# Patient Record
Sex: FEMALE | Race: BLACK OR AFRICAN AMERICAN | NOT HISPANIC OR LATINO | Employment: OTHER | URBAN - METROPOLITAN AREA
[De-identification: names, ages, dates, MRNs, and addresses within clinical notes are randomized per-mention and may not be internally consistent; named-entity substitution may affect disease eponyms.]

---

## 2017-08-14 ENCOUNTER — PATIENT OUTREACH (OUTPATIENT)
Dept: ADMINISTRATIVE | Facility: HOSPITAL | Age: 57
End: 2017-08-14

## 2018-04-10 ENCOUNTER — OFFICE VISIT (OUTPATIENT)
Dept: OTOLARYNGOLOGY | Facility: CLINIC | Age: 58
End: 2018-04-10
Payer: MEDICARE

## 2018-04-10 VITALS
DIASTOLIC BLOOD PRESSURE: 88 MMHG | SYSTOLIC BLOOD PRESSURE: 132 MMHG | WEIGHT: 274.94 LBS | TEMPERATURE: 99 F | HEIGHT: 63 IN | BODY MASS INDEX: 48.71 KG/M2 | HEART RATE: 73 BPM

## 2018-04-10 DIAGNOSIS — J30.89 CHRONIC NONSEASONAL ALLERGIC RHINITIS DUE TO FUNGAL SPORES: ICD-10-CM

## 2018-04-10 DIAGNOSIS — J32.9 CHRONIC RECURRENT SINUSITIS: Primary | ICD-10-CM

## 2018-04-10 DIAGNOSIS — R42 DIZZINESS: ICD-10-CM

## 2018-04-10 PROCEDURE — 99999 PR PBB SHADOW E&M-EST. PATIENT-LVL IV: CPT | Mod: PBBFAC,,, | Performed by: PHYSICIAN ASSISTANT

## 2018-04-10 PROCEDURE — 99204 OFFICE O/P NEW MOD 45 MIN: CPT | Mod: S$GLB,,, | Performed by: PHYSICIAN ASSISTANT

## 2018-04-10 PROCEDURE — 3079F DIAST BP 80-89 MM HG: CPT | Mod: CPTII,S$GLB,, | Performed by: PHYSICIAN ASSISTANT

## 2018-04-10 PROCEDURE — 3075F SYST BP GE 130 - 139MM HG: CPT | Mod: CPTII,S$GLB,, | Performed by: PHYSICIAN ASSISTANT

## 2018-04-10 RX ORDER — ATORVASTATIN CALCIUM 40 MG/1
TABLET, FILM COATED ORAL
COMMUNITY
Start: 2018-03-13

## 2018-04-10 RX ORDER — TIZANIDINE 4 MG/1
4 TABLET ORAL EVERY 6 HOURS PRN
COMMUNITY

## 2018-04-10 RX ORDER — CETIRIZINE HYDROCHLORIDE, PSEUDOEPHEDRINE HYDROCHLORIDE 5; 120 MG/1; MG/1
TABLET, FILM COATED, EXTENDED RELEASE ORAL
COMMUNITY

## 2018-04-10 RX ORDER — HYDROCODONE BITARTRATE AND ACETAMINOPHEN 10; 325 MG/1; MG/1
TABLET ORAL
COMMUNITY

## 2018-04-10 RX ORDER — GABAPENTIN 300 MG/1
300 CAPSULE ORAL 3 TIMES DAILY
COMMUNITY

## 2018-04-10 RX ORDER — LOSARTAN POTASSIUM AND HYDROCHLOROTHIAZIDE 25; 100 MG/1; MG/1
1 TABLET ORAL DAILY
COMMUNITY

## 2018-04-10 RX ORDER — ERGOCALCIFEROL 1.25 MG/1
50000 CAPSULE ORAL
COMMUNITY

## 2018-04-10 RX ORDER — CEFPROZIL 500 MG/1
TABLET, FILM COATED ORAL
COMMUNITY
Start: 2018-04-04

## 2018-04-10 RX ORDER — POTASSIUM CHLORIDE 750 MG/1
20 TABLET, EXTENDED RELEASE ORAL 2 TIMES DAILY
COMMUNITY

## 2018-04-10 NOTE — PROGRESS NOTES
Subjective:       Patient ID: Sobia Jacobo is a 58 y.o. female.    Chief Complaint: Sinusitis    Patient is a very pleasant 58 year old female here to see me today for the first time for evaluation of her sinuses.  She was last here with Dr. Alvarez in 2013 with negative CT sinus at that time.  She reports issues with recurrent sinusitis and allergies.  She was seen in [place], [place] and had allergy testing (positive to molds, trees, grasses) and has been on SCIT for about one year with some relief of her allergy symptoms.  Her last dose of SCIT was last week.  She says her face seems to break out more (past 4-6 months) since starting SCIT.  She complains of nasal congestion, pressure in her face and ears, throat irritation, sneezing and headaches.  She's been treated with multiple antibiotics since January for sinusitis.  Currently on Cefzil.  Had steroid injection last week without improvement in her symptoms.  She's also had Bactrim, Zithromax and Cipro for sinusitis recently.  No previous sinus surgery.  She smokes 1-2 cigarettes/day.  Has been off of Flonase for months due to nasal dryness and irritation; stopped Zyrtec due to nausea.  She is on humidified CPAP at night and tolerates it without difficulty.  No recent fever.  She feels as though her equilibrium is off at times for several months.  Other times she has a spinning sensation; denies issues with dizziness while in bed.  Denies alleviating factors; exacerbated with weather changes.  Previously been told she has fluid in her ears.  Denies hearing loss.  Has occasional tinnitus AU.  No previous otologic surgery; no family history of hearing loss.  Has loud noise exposure history (occupational).      Review of Systems   Constitutional: Negative for activity change, appetite change and fever.   HENT: Positive for congestion, postnasal drip, rhinorrhea, sinus pain, sinus pressure, sneezing, tinnitus (occasional) and voice change (hoarse at times). Negative  for ear discharge, ear pain (pressure AU), hearing loss, nosebleeds, sore throat (dry, irritated) and trouble swallowing.    Eyes: Negative for discharge.   Respiratory: Positive for cough. Negative for shortness of breath and wheezing.         CHRISSY on CPAP   Cardiovascular: Negative for chest pain.   Gastrointestinal: Positive for nausea (with Zyrtec). Negative for diarrhea and vomiting.   Musculoskeletal: Positive for arthralgias.   Allergic/Immunologic: Positive for environmental allergies. Negative for food allergies.   Neurological: Positive for dizziness and headaches. Negative for light-headedness.   Hematological: Negative for adenopathy.   Psychiatric/Behavioral: Negative for confusion.       Objective:      Physical Exam   Constitutional: She is oriented to person, place, and time. She appears well-developed and well-nourished. No distress.   HENT:   Head: Normocephalic and atraumatic.   Right Ear: Tympanic membrane, external ear and ear canal normal. Tympanic membrane is not erythematous. No middle ear effusion.   Left Ear: Tympanic membrane, external ear and ear canal normal. Tympanic membrane is not erythematous.  No middle ear effusion.   Nose: Mucosal edema (hypertrophy of inferior turbinates bilaterally) and septal deviation present. No rhinorrhea or nasal deformity. No epistaxis. Right sinus exhibits maxillary sinus tenderness. Right sinus exhibits no frontal sinus tenderness. Left sinus exhibits maxillary sinus tenderness. Left sinus exhibits no frontal sinus tenderness.   Mouth/Throat: Uvula is midline, oropharynx is clear and moist and mucous membranes are normal. Mucous membranes are not pale and not dry. No trismus in the jaw. Normal dentition. No uvula swelling. No oropharyngeal exudate or posterior oropharyngeal erythema.   Eyes: Conjunctivae, EOM and lids are normal. Pupils are equal, round, and reactive to light. Right eye exhibits no chemosis. Left eye exhibits no chemosis. Right  conjunctiva is not injected. Left conjunctiva is not injected. No scleral icterus. Right eye exhibits normal extraocular motion and no nystagmus. Left eye exhibits normal extraocular motion and no nystagmus.   eyeglasses   Neck: Trachea normal and phonation normal. No tracheal tenderness present. No tracheal deviation present. No thyroid mass and no thyromegaly present.   Cardiovascular: Intact distal pulses.    Pulmonary/Chest: Effort normal. No stridor. No respiratory distress.   Abdominal: She exhibits no distension.   Lymphadenopathy:        Head (right side): No submental, no submandibular, no preauricular and no posterior auricular adenopathy present.        Head (left side): No submental, no submandibular, no preauricular and no posterior auricular adenopathy present.     She has no cervical adenopathy.   Neurological: She is alert and oriented to person, place, and time. No cranial nerve deficit.   Skin: Skin is warm and dry. No rash noted. No erythema.   Psychiatric: She has a normal mood and affect. Her behavior is normal.       Assessment:       1. Chronic recurrent sinusitis    2. Chronic nonseasonal allergic rhinitis due to fungal spores    3. Dizziness        Plan:         At this point, the patient has been on multiple rounds of antibiotics including Amoxicillin, Bactrim, Cefzil and Zithromax and Cipro without significant or sustained improvement in their symptoms.  I would recommend a CT of the sinuses for further evaluation.  If the scan shows persistent sinus disease, she will then need a longer course of antibiotics, likely three to four weeks of Levaquin.  If the scan does not show persistent infection, the patient's symptoms are likely due to underlying allergies and would then maximize allergy therapy.  She's currently one year in to SCIT course and has had some improvement in her symptoms.  We discussed that other treatment options for allergies would be intranasal steroid spray and oral  "antihistamines but she says she's already tried these in the past and is "tired of all of medicines."   She will likely continue with SCIT through PCP ([facility] [place] allergist) if CT is negative.  The patient understands this treatment plan, and will call with results when available.    Discussed with patient that her ear exam today is normal.  I see no middle ear fluid.  I had a long discussion with the patient regarding their symptoms.  Dizziness, vertigo and disequilibrium are common symptoms reported by adults during visits to their doctors. They are all symptoms that can result from a peripheral vestibular disorder (a dysfunction of the balance organs of the inner ear) or central vestibular disorder (a dysfunction of one or more parts of the central nervous system that help process balance and spatial information).  There are also non-vestibular causes of dizziness, and dizziness can be linked to a wide array of problems such as blood-flow irregularities from cardiovascular problems and blood pressure fluctuations.  I would recommend a VNG with audiogram for further diagnostic testing, and will contact the patient with further recommendations when that is complete.       "

## 2018-04-23 ENCOUNTER — CLINICAL SUPPORT (OUTPATIENT)
Dept: AUDIOLOGY | Facility: CLINIC | Age: 58
End: 2018-04-23
Payer: MEDICARE

## 2018-04-23 ENCOUNTER — HOSPITAL ENCOUNTER (OUTPATIENT)
Dept: RADIOLOGY | Facility: HOSPITAL | Age: 58
Discharge: HOME | End: 2018-04-23
Attending: PHYSICIAN ASSISTANT
Payer: MEDICARE

## 2018-04-23 ENCOUNTER — OFFICE VISIT (OUTPATIENT)
Dept: GASTROENTEROLOGY | Facility: CLINIC | Age: 58
End: 2018-04-23
Payer: MEDICARE

## 2018-04-23 VITALS
SYSTOLIC BLOOD PRESSURE: 134 MMHG | DIASTOLIC BLOOD PRESSURE: 90 MMHG | HEIGHT: 63 IN | HEART RATE: 70 BPM | BODY MASS INDEX: 49.14 KG/M2 | WEIGHT: 277.31 LBS

## 2018-04-23 DIAGNOSIS — J30.89 CHRONIC NONSEASONAL ALLERGIC RHINITIS DUE TO FUNGAL SPORES: ICD-10-CM

## 2018-04-23 DIAGNOSIS — K76.0 NAFLD (NONALCOHOLIC FATTY LIVER DISEASE): ICD-10-CM

## 2018-04-23 DIAGNOSIS — J32.9 CHRONIC RECURRENT SINUSITIS: ICD-10-CM

## 2018-04-23 DIAGNOSIS — R79.89 ABNORMAL LFTS: ICD-10-CM

## 2018-04-23 DIAGNOSIS — K57.30 DIVERTICULOSIS OF LARGE INTESTINE WITHOUT HEMORRHAGE: ICD-10-CM

## 2018-04-23 DIAGNOSIS — H81.02 ENDOLYMPHATIC HYDROPS, LEFT: Primary | ICD-10-CM

## 2018-04-23 DIAGNOSIS — H91.91 LOW FREQUENCY HEARING LOSS OF RIGHT EAR: ICD-10-CM

## 2018-04-23 DIAGNOSIS — H90.41 SENSORINEURAL HEARING LOSS (SNHL) OF RIGHT EAR WITH UNRESTRICTED HEARING OF LEFT EAR: Primary | ICD-10-CM

## 2018-04-23 DIAGNOSIS — R19.7 DIARRHEA IN ADULT PATIENT: Primary | ICD-10-CM

## 2018-04-23 DIAGNOSIS — Z86.010 HISTORY OF COLON POLYPS: ICD-10-CM

## 2018-04-23 DIAGNOSIS — E66.01 MORBID OBESITY: ICD-10-CM

## 2018-04-23 PROCEDURE — 99213 OFFICE O/P EST LOW 20 MIN: CPT | Mod: S$GLB,,, | Performed by: INTERNAL MEDICINE

## 2018-04-23 PROCEDURE — 99999 PR PBB SHADOW E&M-EST. PATIENT-LVL III: CPT | Mod: PBBFAC,,, | Performed by: INTERNAL MEDICINE

## 2018-04-23 PROCEDURE — 92557 COMPREHENSIVE HEARING TEST: CPT | Mod: S$GLB,,, | Performed by: AUDIOLOGIST-HEARING AID FITTER

## 2018-04-23 PROCEDURE — 70486 CT MAXILLOFACIAL W/O DYE: CPT | Mod: TC

## 2018-04-23 PROCEDURE — 92567 TYMPANOMETRY: CPT | Mod: S$GLB,,, | Performed by: AUDIOLOGIST-HEARING AID FITTER

## 2018-04-23 PROCEDURE — 92540 BASIC VESTIBULAR EVALUATION: CPT | Mod: S$GLB,,, | Performed by: AUDIOLOGIST-HEARING AID FITTER

## 2018-04-23 PROCEDURE — 3080F DIAST BP >= 90 MM HG: CPT | Mod: CPTII,S$GLB,, | Performed by: INTERNAL MEDICINE

## 2018-04-23 PROCEDURE — 3075F SYST BP GE 130 - 139MM HG: CPT | Mod: CPTII,S$GLB,, | Performed by: INTERNAL MEDICINE

## 2018-04-23 PROCEDURE — 92537 CALORIC VSTBLR TEST W/REC: CPT | Mod: S$GLB,,, | Performed by: AUDIOLOGIST-HEARING AID FITTER

## 2018-04-23 PROCEDURE — 92584 ELECTROCOCHLEOGRAPHY: CPT | Mod: S$GLB,,, | Performed by: AUDIOLOGIST-HEARING AID FITTER

## 2018-04-23 NOTE — PROGRESS NOTES
Subjective:       Patient ID: Sobia Jacobo is a 58 y.o. female.    Chief Complaint: Diarrhea and Abdominal Pain    E patient who has had recent issues with sinusitis requiring a series of antibiotic treatments, now presents with complaint of diarrhea. She just finished her last treatment about 10 days ago. Her last treatment was with Zithromax. She has had bactrim, and Cipro as well. She began with worsening diarrhea during the course of her treatments. There has been improvement since treatment completed. She has had no fever, chills or sweats associated, though she has had sweats for other reasons. There has been no associated abdominal pain or nausea or vomiting. There has been no BRBPR or melena.     She has been having diarrhea before but she'd also been on Metformin that was stopped in January. She has had colonoscopies in 2012 ans 12965. She is not due for another screening study until 1020. She also has diverticulosis.     Finally she has NAFLD. We have no recent labs but she says her HGB A1C has been in the 5s and her Metformin stopped. Discussed the etiologies of NAFLD and it has been noted that she has had no problems with high triglycerides and has normal HGB A1C so these things likely not playing a role. This is likely from Morbid Obesity.      Review of Systems   Constitutional: Positive for fatigue. Negative for activity change, appetite change, chills, diaphoresis, fever and unexpected weight change.   HENT: Positive for ear discharge, postnasal drip and voice change. Negative for congestion, ear pain, hearing loss, nosebleeds and tinnitus.    Eyes: Negative for photophobia and visual disturbance.   Respiratory: Negative for apnea, cough, choking, chest tightness, shortness of breath and wheezing.    Cardiovascular: Negative for chest pain, palpitations and leg swelling.   Gastrointestinal: Positive for diarrhea. Negative for abdominal distention, abdominal pain, anal bleeding, blood in stool,  constipation, nausea, rectal pain and vomiting.   Genitourinary: Negative for difficulty urinating, dyspareunia, dysuria, flank pain, frequency, hematuria, menstrual problem, pelvic pain, urgency, vaginal bleeding and vaginal discharge.   Musculoskeletal: Positive for back pain and joint swelling. Negative for arthralgias, gait problem, myalgias and neck stiffness.        Joint stiffness   Skin: Positive for rash. Negative for pallor.   Neurological: Negative for dizziness, tremors, seizures, syncope, speech difficulty, weakness, numbness and headaches.        Coordination problems   Hematological: Negative for adenopathy.   Psychiatric/Behavioral: Negative for agitation, confusion, hallucinations, sleep disturbance and suicidal ideas.       Objective:      Physical Exam   Constitutional: She is oriented to person, place, and time. She appears well-developed and well-nourished.   Morbid Obesity   HENT:   Head: Normocephalic and atraumatic.   Bilateral turbinate congestion   Eyes: Conjunctivae and EOM are normal. Pupils are equal, round, and reactive to light. Right eye exhibits no discharge. Left eye exhibits no discharge. No scleral icterus.   Neck: Normal range of motion. Neck supple. No JVD present. No thyromegaly present.   Faint right carotid bruit   Cardiovascular: Normal rate, regular rhythm, normal heart sounds and intact distal pulses.  Exam reveals no gallop and no friction rub.    No murmur heard.  Pulmonary/Chest: Effort normal and breath sounds normal. No respiratory distress. She has no wheezes. She has no rales. She exhibits no tenderness.   Abdominal: Soft. Bowel sounds are normal. She exhibits no distension and no mass. There is no tenderness. There is no rebound and no guarding.   Musculoskeletal: Normal range of motion. She exhibits no edema.   Lymphadenopathy:     She has no cervical adenopathy.   Neurological: She is alert and oriented to person, place, and time. She has normal reflexes. She  exhibits normal muscle tone. Coordination normal.   Skin: Skin is warm and dry. No rash noted. No erythema. No pallor.   Psychiatric: She has a normal mood and affect. Her behavior is normal. Judgment and thought content normal.   Vitals reviewed.      Assessment:    Diarrhea   Abnormal LFTs   NAFLD   Morbid Obesity   Hx of Colon Polyps   Diverticulosis   No diagnosis found.    Plan:   Stool studies  Hepatitis panel  Other labs for appropriate workup  Further recommendations to follow

## 2018-04-23 NOTE — PROGRESS NOTES
Referring provider: Tali Muñiz PA-C    Sobia Jacobo was seen 04/23/2018 for an audiological evaluation.  Patient complains of problems with equilibrium at times for several months.  Other times she has a spinning sensation; denies issues with dizziness while in bed.  Denies alleviating factors; exacerbated with weather changes.  Denies hearing loss, noting equally sided hearing except at times she is having sinus problems.  During those times she notes fluctuations in her hearing.  Has occasional tinnitus AU recurring over the past year.  No previous otologic surgery; no family history of hearing loss.  Has loud noise exposure history (occupational).    Results reveal a mild low-frequency 125-500 Hz sensorineural hearing loss rising to normal 1851-3759 Hz for the right ear, and normal hearing 125-8000 Hz for the left ear.   Speech Reception Thresholds were  15 dBHL for the right ear and 10 dBHL for the left ear.   Word recognition scores were excellent for the right ear and excellent for the left ear.   Tympanograms were Type A, normal for the right ear and Type A, normal for the left ear.    Patient was counseled on the above findings.    Rec: VNG/EcoG -returns later today to complete testing

## 2018-04-27 ENCOUNTER — PATIENT MESSAGE (OUTPATIENT)
Dept: GASTROENTEROLOGY | Facility: CLINIC | Age: 58
End: 2018-04-27

## 2018-05-01 ENCOUNTER — TELEPHONE (OUTPATIENT)
Dept: OTOLARYNGOLOGY | Facility: CLINIC | Age: 58
End: 2018-05-01

## 2018-05-01 NOTE — TELEPHONE ENCOUNTER
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  Her VNG was normal but Ecog showed Meniere's.  Left message for her to call me back to discuss further.    Hydrops, or Meniere's is caused by excess endolymphatic fluid in the inner ear.  Hydrops was previously classified as Meniere's disease in which the classic constellation of symptoms included dizziness, fullness, tinnitus, and fluctuating hearing loss.  However, as more research is being done, it is now accepted that a patient with hydrops can have one, some, or all of those symptoms.  I agree with first starting on the hydrops diet as discussed with Darlin, which is a low sodium diet.  If symptoms persist, she will then have a BMP done to check electrolytes and will then start on oral dyazide.  If symptoms are persistent after a two month trial of the diuretic, will then send a referral to Dr. Pierre at [facility], as there are also other treatment modalities, including surgery, available.

## 2024-04-22 ENCOUNTER — OFFICE VISIT (OUTPATIENT)
Dept: OTOLARYNGOLOGY | Facility: CLINIC | Age: 64
End: 2024-04-22
Payer: MEDICARE

## 2024-04-22 VITALS — HEIGHT: 63 IN | BODY MASS INDEX: 38.21 KG/M2 | TEMPERATURE: 98 F | WEIGHT: 215.63 LBS

## 2024-04-22 DIAGNOSIS — J32.4 CHRONIC PANSINUSITIS: Primary | ICD-10-CM

## 2024-04-22 PROCEDURE — 3008F BODY MASS INDEX DOCD: CPT | Mod: CPTII,S$GLB,, | Performed by: PHYSICIAN ASSISTANT

## 2024-04-22 PROCEDURE — 99214 OFFICE O/P EST MOD 30 MIN: CPT | Mod: S$GLB,,, | Performed by: PHYSICIAN ASSISTANT

## 2024-04-22 PROCEDURE — 4010F ACE/ARB THERAPY RXD/TAKEN: CPT | Mod: CPTII,S$GLB,, | Performed by: PHYSICIAN ASSISTANT

## 2024-04-22 PROCEDURE — 99999 PR PBB SHADOW E&M-NEW PATIENT-LVL III: CPT | Mod: PBBFAC,,, | Performed by: PHYSICIAN ASSISTANT

## 2024-04-22 NOTE — PROGRESS NOTES
Subjective:   Patient ID: Sobia Jacobo is a 64 y.o. female.    Chief Complaint: Sinusitis (Allergy and sinus symptoms. Patient was getting allergy injections but she stopped due to getting sinus infections. Patient c/o bloody mucus coming out of her nose. She c/o headaches and teeth pain. When leaning down she gets sinus pressure. She states as long as she is on antibiotics she feels fine but once antibiotic is complete the symptoms return.)     Ms. Jacobo is a 63 yo female here to see me today with c/o recurrent sinusitis, pain on left face. She was last here with Tali Muñiz in 2018 with negative CT  and before that 2013 with negative CT sinus at that time.  She reports issues with recurrent sinusitis and allergies.  She was seen in [place], [place] and had allergy testing (positive to molds, trees, grasses) and has been on SCIT for about 2 year with some relief of her allergy symptoms but stayed with sinus infections. She complains of nasal congestion, pressure in her face and ears, throat irritation, sneezing and headaches.  She's been treated with multiple antibiotics since January for sinusitis ( 4-5) .  She just completed Amoxil last week.  She's also had Bactrim, Zithromax and Cipro for sinusitis recently.  No previous sinus surgery.  She quit smoking in 2019.  She currently taking Singulair, Flonase.  No recent fever.  She feels like her balance is off and her vision is blurry.       Review of patient's allergies indicates:   Allergen Reactions    Aspirin     Mobic [meloxicam]     Penicillins            Review of Systems   HENT:  Positive for nosebleeds, postnasal drip, sinus pressure and voice change.    Eyes:  Positive for itching.   Cardiovascular: Negative.    Gastrointestinal:  Positive for diarrhea.   Endocrine: Negative.    Genitourinary: Negative.    Musculoskeletal:  Positive for back pain.   Skin: Negative.    Neurological:  Positive for dizziness, tremors, light-headedness and headaches.  "  Hematological:  Bruises/bleeds easily.   Psychiatric/Behavioral:  Positive for decreased concentration and sleep disturbance.          Objective:   Temp 98.1 °F (36.7 °C) (Temporal)   Ht 5' 3" (1.6 m)   Wt 97.8 kg (215 lb 9.8 oz)   BMI 38.19 kg/m²     Physical Exam  Constitutional:       General: She is not in acute distress.     Appearance: She is well-developed.   HENT:      Head: Normocephalic and atraumatic.      Right Ear: Tympanic membrane, ear canal and external ear normal.      Left Ear: Tympanic membrane, ear canal and external ear normal.      Nose: No nasal deformity, septal deviation, mucosal edema or rhinorrhea.      Right Sinus: No maxillary sinus tenderness or frontal sinus tenderness.      Left Sinus: Maxillary sinus tenderness and frontal sinus tenderness present.      Mouth/Throat:      Mouth: Mucous membranes are not pale and not dry.      Dentition: No dental caries.      Pharynx: Uvula midline. No oropharyngeal exudate or posterior oropharyngeal erythema.   Eyes:      General: Lids are normal. No scleral icterus.     Extraocular Movements:      Right eye: Normal extraocular motion and no nystagmus.      Left eye: Normal extraocular motion and no nystagmus.      Conjunctiva/sclera: Conjunctivae normal.      Right eye: Right conjunctiva is not injected. No chemosis.     Left eye: Left conjunctiva is not injected. No chemosis.     Pupils: Pupils are equal, round, and reactive to light.   Neck:      Thyroid: No thyroid mass or thyromegaly.      Trachea: Trachea and phonation normal. No tracheal tenderness or tracheal deviation.   Pulmonary:      Effort: Pulmonary effort is normal. No respiratory distress.      Breath sounds: No stridor.   Abdominal:      General: There is no distension.   Lymphadenopathy:      Head:      Right side of head: No submental, submandibular, preauricular, posterior auricular or occipital adenopathy.      Left side of head: No submental, submandibular, preauricular, " "posterior auricular or occipital adenopathy.      Cervical: No cervical adenopathy.   Skin:     General: Skin is warm and dry.      Findings: No erythema or rash.   Neurological:      Mental Status: She is alert and oriented to person, place, and time.      Cranial Nerves: No cranial nerve deficit.   Psychiatric:         Behavior: Behavior normal.            Assessment:     1. Chronic pansinusitis        Plan:     Chronic pansinusitis  -     CT Sinuses without Contrast; Future; Expected date: 04/22/2024        At this point, the patient has been on multiple rounds of antibiotics including Amoxicillin, Bactrim, Cefzil and Zithromax and Cipro without significant or sustained improvement in their symptoms.  I would recommend a CT of the sinuses for further evaluation.  If the scan shows persistent sinus disease, she will then need a longer course of antibiotics, likely three to four weeks of Levaquin.  If the scan does not show persistent infection, the patient's symptoms are likely due to underlying allergies and would then maximize allergy therapy.    We discussed that other treatment options for allergies would be intranasal steroid spray and oral antihistamines but she says she's already tried these in the past and is "tired of all of medicines" and they upset her stomach. Will plan to have her follow up with MD for possible nasal endoscopy and further treatment options.    "

## 2024-06-03 ENCOUNTER — TELEPHONE (OUTPATIENT)
Dept: OTOLARYNGOLOGY | Facility: CLINIC | Age: 64
End: 2024-06-03

## 2024-06-03 ENCOUNTER — OFFICE VISIT (OUTPATIENT)
Dept: OTOLARYNGOLOGY | Facility: CLINIC | Age: 64
End: 2024-06-03
Attending: PHYSICIAN ASSISTANT
Payer: MEDICARE

## 2024-06-03 ENCOUNTER — HOSPITAL ENCOUNTER (OUTPATIENT)
Dept: RADIOLOGY | Facility: HOSPITAL | Age: 64
Discharge: HOME | End: 2024-06-03
Attending: PHYSICIAN ASSISTANT
Payer: MEDICARE

## 2024-06-03 DIAGNOSIS — R51.9 NONINTRACTABLE HEADACHE, UNSPECIFIED CHRONICITY PATTERN, UNSPECIFIED HEADACHE TYPE: Primary | ICD-10-CM

## 2024-06-03 DIAGNOSIS — J30.9 ALLERGIC RHINITIS, UNSPECIFIED SEASONALITY, UNSPECIFIED TRIGGER: ICD-10-CM

## 2024-06-03 DIAGNOSIS — J32.4 CHRONIC PANSINUSITIS: ICD-10-CM

## 2024-06-03 PROCEDURE — 70486 CT MAXILLOFACIAL W/O DYE: CPT | Mod: TC

## 2024-06-03 PROCEDURE — 4010F ACE/ARB THERAPY RXD/TAKEN: CPT | Mod: CPTII,S$GLB,, | Performed by: STUDENT IN AN ORGANIZED HEALTH CARE EDUCATION/TRAINING PROGRAM

## 2024-06-03 PROCEDURE — 1159F MED LIST DOCD IN RCRD: CPT | Mod: CPTII,S$GLB,, | Performed by: STUDENT IN AN ORGANIZED HEALTH CARE EDUCATION/TRAINING PROGRAM

## 2024-06-03 PROCEDURE — 99999 PR PBB SHADOW E&M-EST. PATIENT-LVL III: CPT | Mod: PBBFAC,,, | Performed by: STUDENT IN AN ORGANIZED HEALTH CARE EDUCATION/TRAINING PROGRAM

## 2024-06-03 PROCEDURE — 31231 NASAL ENDOSCOPY DX: CPT | Mod: S$GLB,,, | Performed by: STUDENT IN AN ORGANIZED HEALTH CARE EDUCATION/TRAINING PROGRAM

## 2024-06-03 PROCEDURE — 99214 OFFICE O/P EST MOD 30 MIN: CPT | Mod: 25,S$GLB,, | Performed by: STUDENT IN AN ORGANIZED HEALTH CARE EDUCATION/TRAINING PROGRAM

## 2024-06-03 PROCEDURE — 70486 CT MAXILLOFACIAL W/O DYE: CPT | Mod: 26,,, | Performed by: RADIOLOGY

## 2024-06-03 RX ORDER — FLUTICASONE FUROATE 27.5 UG/1
2 SPRAY, METERED NASAL DAILY
Qty: 9.1 ML | Refills: 6 | Status: SHIPPED | OUTPATIENT
Start: 2024-06-03

## 2024-06-03 NOTE — PROGRESS NOTES
Chief complaint:    Chief Complaint   Patient presents with    Consult     Pt is coming in today for her CT follow up, pt states she bumped her head on the CT machine and now she has a knot on her head and she is concerned             Referring Provider:  Rita Chacon Pa-c  [id number] [facility] [place]  [place] [address]      History of present illness, with Rita Chacon PA-C 4/22/24:     Ms. Jacobo is a 65 yo female here to see me today with c/o recurrent sinusitis, pain on left face. She was last here with Tali Muñiz in 2018 with negative CT and before that 2013 with negative CT sinus at that time. She reports issues with recurrent sinusitis and allergies. She was seen in [place], [place] and had allergy testing (positive to molds, trees, grasses) and has been on SCIT for about 2 year with some relief of her allergy symptoms but stayed with sinus infections. She complains of nasal congestion, pressure in her face and ears, throat irritation, sneezing and headaches. She's been treated with multiple antibiotics since January for sinusitis ( 4-5) . She just completed Amoxil last week. She's also had Bactrim, Zithromax and Cipro for sinusitis recently. No previous sinus surgery. She quit smoking in 2019. She currently taking Singulair, Flonase. No recent fever. She feels like her balance is off and her vision is blurry       Update 6/3/24    Major symptoms include bloody mucus when blowing her nose in the mornings, headaches (left forehead/facial/occipital, sensitive to light, floaters), scratchy throat, blurry vision, feeling off balance, tinnitus, ears feeling wet in the mornings.   Her symptoms have been present for many years, and progressively worsening recently.  Has done allergy shots. States they did not help the sinuses.   States she has had 5+ sinus infections since January.   Tx has included montelukast, flonase       History      Past Medical History:   Past Medical History:   Diagnosis Date     Fatty liver     GERD (gastroesophageal reflux disease)     Hypertension     Prediabetes     Seasonal allergies     Sleep apnea          Past Surgical History:  Past Surgical History:   Procedure Laterality Date    breast reduction      HYSTERECTOMY      ROTATOR CUFF REPAIR           Medications: Medication list reviewed. She  has a current medication list which includes the following prescription(s): atorvastatin, ergocalciferol, furosemide, gabapentin, hydrocodone-acetaminophen, losartan-hydrochlorothiazide 100-25 mg, pantoprazole, potassium chloride sa, albuterol, brompheniramine-pseudoephedrine-dextromethorphan, cefprozil, cetirizine-pseudoephedrine, clonazepam, flonase sensimist, metformin, metoprolol succinate, and tizanidine.     Allergies:   Review of patient's allergies indicates:   Allergen Reactions    Aspirin     Mobic [meloxicam]     Penicillins          Family history: family history includes Asthma in her cousin, maternal grandmother, and paternal aunt; Diabetes in her sister; Hypertension in her father and mother.         Social History          Alcohol use:  reports current alcohol use of about 7.0 standard drinks of alcohol per week.            Tobacco:  reports that she has been smoking cigarettes. She has a 2 pack-year smoking history. She does not have any smokeless tobacco history on file.         Physical Examination      Vitals: There were no vitals taken for this visit.      General: Well developed, well nourished, well hydrated.     Voice: no dysphonia, no dysarthria      Head/Face: Normocephalic, atraumatic. No scars or lesions. Facial musculature equal.     Eyes: No scleral icterus or conjunctival hemorrhage. EOMI. PERRLA.     Ears:     Right ear: No gross deformity. EAC is clear of debris and erythema. TM are intact with a pneumatized middle ear. No signs of retraction, fluid or infection.      Left ear: No gross deformity. EAC is clear of debris and erythema. TM are intact with a  pneumatized middle ear. No signs of retraction, fluid or infection.      Nose: No gross deformity or lesions. No purulent discharge. No significant NSD.     Mouth/Oropharynx: Lips without any lesions. No mucosal lesions within the oropharynx. No tonsillar exudate or lesions. Pharyngeal walls symmetrical. Uvula midline. Tongue midline without lesions.     Neurologic: Moving all extremities without gross abnormality.CN II-XII grossly intact. House-Brackmann 1/6. No signs of nystagmus.          Data reviewed      Review of records:      I reviewed records from the referring provider's office visits describing the history, workup, and/or treatment of this problem thus far.       Imaging:      I have independently reviewed the following imaging with the findings noted below:     CT sinus 6/3/24  Clear paranasal sinuses  Mild right septal deviation  Inferior turbinate hypertrophy     NASAL ENDOSCOPY       Indication: Sobia Jacobo is a 64 y.o. female  with sinonasal symptoms that were not able to be explained by anterior rhinoscopy alone, thus necessitating nasal endoscopy.     Procedure: Risks, benefits, and alternatives of the procedure were discussed with the patient, and the patient consented to the nasal endoscopy.  The nasal cavity was sprayed with a topical decongestant and anesthetic (if needed). The endoscope was passed into each nostril and each nasal cavity was visualized.  On each side the nasal cavity, sinuses (if open), turbinates, middle and superior meatus, sphenoethmoidal recess and septum were examined with the findings described below. At the end of the examination, the scope was removed. The patient tolerated the procedure well with no complications.       Endoscopic Sinonasal Exam Findings:  -     The right side has normal mucosa  -     The left side has normal mucosa  -     Nasal secretions: No discolored secretions noted bilaterally  -     Nasal septum: no significant deviation or perforation  appreciated   -     Inferior turbinate: Normal mucosa without significant hypertrophy bilaterally  -     Middle turbinate: Normal mucosa without significant hypertrophy bilaterally  -     Other findings: none      Assessment/Plan:    1. Nonintractable headache, unspecified chronicity pattern, unspecified headache type    2. Allergic rhinitis, unspecified seasonality, unspecified trigger      Suspect headache and ocular symptoms are related to migraine. She does endorse prior history of migraine with severe episodes dating back to 2009. Recommend Excedrin/ibuprofen/tylenol for OTC abortive therapy and neurology referral    She does have concurrent allergic rhinitis and we discussed the following:  Switch to flonase sensimist for bloody mucus in mornings  Continue oral antihistamine, nasal steroid, montelukast          Barrera Quezada MD  [facility]   [facility] [address]  [place] [address]  P: [phone]  F: [phone]

## 2024-06-03 NOTE — TELEPHONE ENCOUNTER
Call placed to patient in regards to CT sinus. Informed patient per Rita PERSAUD      Please call and let Ms. Jacobo know that her CT sinus is clear without signs of infection.   Patient verbalized understanding.

## 2024-06-11 ENCOUNTER — TELEPHONE (OUTPATIENT)
Dept: OTOLARYNGOLOGY | Facility: CLINIC | Age: 64
End: 2024-06-11
Payer: MEDICARE

## 2024-06-11 NOTE — TELEPHONE ENCOUNTER
----- Message from Nury Wynn sent at 6/11/2024  8:46 AM CDT -----  Regarding: concerns  Name of who is calling:   Trisha / [facility]      What is the request in detail: Requesting a call back in ref to rx for nasal spray / clarification on switching rx due to no longer carry original      Can the clinic reply by [facility]:no      What number to call back if not [facility]:[phone]

## 2024-09-16 ENCOUNTER — TELEPHONE (OUTPATIENT)
Dept: ALLERGY | Facility: CLINIC | Age: 64
End: 2024-09-16
Payer: MEDICARE

## 2024-09-16 NOTE — TELEPHONE ENCOUNTER
9/16/24 - Spoke with patient regarding appointment reschedule. Patient would like to be seen sooner. Advised patient to call back at a later time due to not having nothing available on a Friday during her preferred time. Patient's future appointment is added to the waiting list. - IB  ----- Message from Jayla Simmons sent at 9/16/2024  1:34 PM CDT -----  Contact: Sobia Ramsay is calling to speak to the nurse regarding her scheduled appointment on 11/06, patient is calling to reschedule, please give her a call at , she would like a appointment sooner than what I have    Thanks  LJ

## 2024-09-17 ENCOUNTER — TELEPHONE (OUTPATIENT)
Dept: ALLERGY | Facility: CLINIC | Age: 64
End: 2024-09-17
Payer: MEDICARE

## 2024-09-19 ENCOUNTER — OFFICE VISIT (OUTPATIENT)
Dept: ALLERGY | Facility: CLINIC | Age: 64
End: 2024-09-19
Payer: MEDICARE

## 2024-09-19 ENCOUNTER — LAB VISIT (OUTPATIENT)
Dept: LAB | Facility: HOSPITAL | Age: 64
End: 2024-09-19
Attending: STUDENT IN AN ORGANIZED HEALTH CARE EDUCATION/TRAINING PROGRAM
Payer: MEDICARE

## 2024-09-19 VITALS
HEIGHT: 63 IN | DIASTOLIC BLOOD PRESSURE: 82 MMHG | TEMPERATURE: 99 F | HEART RATE: 74 BPM | SYSTOLIC BLOOD PRESSURE: 126 MMHG | WEIGHT: 209.44 LBS | BODY MASS INDEX: 37.11 KG/M2

## 2024-09-19 DIAGNOSIS — J32.9 RECURRENT SINUS INFECTIONS: ICD-10-CM

## 2024-09-19 DIAGNOSIS — J31.0 CHRONIC RHINITIS: Primary | ICD-10-CM

## 2024-09-19 DIAGNOSIS — J31.0 CHRONIC RHINITIS: ICD-10-CM

## 2024-09-19 DIAGNOSIS — T50.905A ADVERSE EFFECT OF DRUG, INITIAL ENCOUNTER: ICD-10-CM

## 2024-09-19 LAB
BASOPHILS # BLD AUTO: 0.02 K/UL (ref 0–0.2)
BASOPHILS NFR BLD: 0.3 % (ref 0–1.9)
DIFFERENTIAL METHOD BLD: ABNORMAL
EOSINOPHIL # BLD AUTO: 0.1 K/UL (ref 0–0.5)
EOSINOPHIL NFR BLD: 1.2 % (ref 0–8)
ERYTHROCYTE [DISTWIDTH] IN BLOOD BY AUTOMATED COUNT: 15.2 % (ref 11.5–14.5)
HCT VFR BLD AUTO: 38.6 % (ref 37–48.5)
HGB BLD-MCNC: 11.9 G/DL (ref 12–16)
IMM GRANULOCYTES # BLD AUTO: 0.01 K/UL (ref 0–0.04)
IMM GRANULOCYTES NFR BLD AUTO: 0.2 % (ref 0–0.5)
LYMPHOCYTES # BLD AUTO: 2.6 K/UL (ref 1–4.8)
LYMPHOCYTES NFR BLD: 45.1 % (ref 18–48)
MCH RBC QN AUTO: 26.7 PG (ref 27–31)
MCHC RBC AUTO-ENTMCNC: 30.8 G/DL (ref 32–36)
MCV RBC AUTO: 87 FL (ref 82–98)
MONOCYTES # BLD AUTO: 0.6 K/UL (ref 0.3–1)
MONOCYTES NFR BLD: 11 % (ref 4–15)
NEUTROPHILS # BLD AUTO: 2.4 K/UL (ref 1.8–7.7)
NEUTROPHILS NFR BLD: 42.2 % (ref 38–73)
NRBC BLD-RTO: 0 /100 WBC
PLATELET # BLD AUTO: 172 K/UL (ref 150–450)
PMV BLD AUTO: 11.6 FL (ref 9.2–12.9)
RBC # BLD AUTO: 4.45 M/UL (ref 4–5.4)
WBC # BLD AUTO: 5.74 K/UL (ref 3.9–12.7)

## 2024-09-19 PROCEDURE — 86317 IMMUNOASSAY INFECTIOUS AGENT: CPT | Performed by: STUDENT IN AN ORGANIZED HEALTH CARE EDUCATION/TRAINING PROGRAM

## 2024-09-19 PROCEDURE — 82785 ASSAY OF IGE: CPT | Performed by: STUDENT IN AN ORGANIZED HEALTH CARE EDUCATION/TRAINING PROGRAM

## 2024-09-19 PROCEDURE — 85025 COMPLETE CBC W/AUTO DIFF WBC: CPT | Performed by: STUDENT IN AN ORGANIZED HEALTH CARE EDUCATION/TRAINING PROGRAM

## 2024-09-19 PROCEDURE — 82784 ASSAY IGA/IGD/IGG/IGM EACH: CPT | Mod: 59 | Performed by: STUDENT IN AN ORGANIZED HEALTH CARE EDUCATION/TRAINING PROGRAM

## 2024-09-19 PROCEDURE — 82784 ASSAY IGA/IGD/IGG/IGM EACH: CPT | Performed by: STUDENT IN AN ORGANIZED HEALTH CARE EDUCATION/TRAINING PROGRAM

## 2024-09-19 PROCEDURE — 86003 ALLG SPEC IGE CRUDE XTRC EA: CPT | Performed by: STUDENT IN AN ORGANIZED HEALTH CARE EDUCATION/TRAINING PROGRAM

## 2024-09-19 PROCEDURE — 99999 PR PBB SHADOW E&M-EST. PATIENT-LVL IV: CPT | Mod: PBBFAC,,, | Performed by: STUDENT IN AN ORGANIZED HEALTH CARE EDUCATION/TRAINING PROGRAM

## 2024-09-19 PROCEDURE — 87389 HIV-1 AG W/HIV-1&-2 AB AG IA: CPT | Performed by: STUDENT IN AN ORGANIZED HEALTH CARE EDUCATION/TRAINING PROGRAM

## 2024-09-19 RX ORDER — AZELASTINE 1 MG/ML
1 SPRAY, METERED NASAL 2 TIMES DAILY
Qty: 90 ML | Refills: 2 | Status: SHIPPED | OUTPATIENT
Start: 2024-09-19 | End: 2025-09-19

## 2024-09-19 RX ORDER — FLUTICASONE PROPIONATE 50 MCG
1 SPRAY, SUSPENSION (ML) NASAL 2 TIMES DAILY
Qty: 16 G | Refills: 11 | Status: SHIPPED | OUTPATIENT
Start: 2024-09-19 | End: 2025-09-19

## 2024-09-19 NOTE — PROGRESS NOTES
Allergy and Immunology  New Patient Clinic Note    Date: 9/19/2024  Chief Complaint   Patient presents with    Allergic Rhinitis      Referred by: Self, Aaareferral  No address on file    History  Sobia Jacobo is a 64 y.o. female being seen as a New Patient today.    Chronic Rhinitis   - Onset: Childhood with worsening in adulthood   - Symptoms: Congestion, rhinorrhea, sneezing, PND, throat clearing   - Suspected triggers include: Environmental v overproduction   - Pattern: Perennial with Seasonal Exacerbation  - Medications: PRN antihistamines   - Prior AIT for 2 years     Chronic or Inducible Urticaria  - No hx of chronic urticaria     Asthma   - No hx of asthma    CRSwNP  - No hx of CRSwNP     Eczema   - No hx of eczema     Eosinophilic Esophagitis  - No hx of eosinophilic esophagitis     Food Allergy  - No hx of food allergy     Drug Allergy  - Mobic: Chest tightness, no urticaria   - ASA: tachycardia     Recurrent Infections  - Patient with recurrent infections   - Patient reported 3-4 infections per years  - No hx of otitis media or PNA in adulthood   - No hx of sepsis or recurrent viral/fungal infection    Venom Allergy  - No hx of venom allergy    Allergies, PMH, PSH, Social, and Family History were reviewed.    Review of patient's allergies indicates:   Allergen Reactions    Mobic [meloxicam] Shortness Of Breath    Aspirin       Past Medical History:   Diagnosis Date    Fatty liver     GERD (gastroesophageal reflux disease)     Hypertension     Prediabetes     Seasonal allergies     Sleep apnea      Past Surgical History:   Procedure Laterality Date    breast reduction      HYSTERECTOMY      ROTATOR CUFF REPAIR       Social History     Social History Narrative    Not on file     S/he reports that she has been smoking cigarettes. She has a 2 pack-year smoking history. She does not have any smokeless tobacco history on file. She reports current alcohol use of about 7.0 standard drinks of alcohol per week.  She reports that she does not use drugs.    Current Outpatient Medications on File Prior to Visit   Medication Sig Dispense Refill    ergocalciferol (ERGOCALCIFEROL) 50,000 unit Cap Take 50,000 Units by mouth every 7 days.      fluticasone (FLONASE SENSIMIST) 27.5 mcg/actuation nasal spray 2 sprays by Nasal route once daily. 9.1 mL 6    furosemide (LASIX) 40 MG tablet Take 1 tablet (40 mg total) by mouth 2 (two) times daily. 60 tablet 3    gabapentin (NEURONTIN) 300 MG capsule Take 300 mg by mouth 3 (three) times daily.      hydrocodone-acetaminophen 10-325mg (NORCO)  mg Tab Take by mouth.      losartan-hydrochlorothiazide 100-25 mg (HYZAAR) 100-25 mg per tablet Take 1 tablet by mouth once daily.      pantoprazole (PROTONIX) 20 MG tablet Take 1 tablet (20 mg total) by mouth once daily. 30 tablet 0    potassium chloride SA (K-DUR,KLOR-CON) 10 MEQ tablet Take 20 mEq by mouth 2 (two) times daily.      albuterol 90 mcg/actuation inhaler Inhale 2 puffs into the lungs every 4 (four) hours as needed. 1 Inhaler 2    atorvastatin (LIPITOR) 40 MG tablet       brompheniramine-pseudoephedrine-dextromethorphan (DIMETAPP DM) 1-15-5 mg/5 mL Elix Take by mouth every 6 (six) hours as needed.      cefPROZIL (CEFZIL) 500 MG tablet       cetirizine-pseudoephedrine 5-120 mg Tb12 Take by mouth.      clonazePAM (KLONOPIN) 0.5 MG tablet Take 0.5 mg by mouth every evening.      metformin (GLUCOPHAGE) 500 MG tablet Take 1 tablet (500 mg total) by mouth 2 (two) times daily with meals. 60 tablet 6    metoprolol succinate (TOPROL-XL) 25 MG 24 hr tablet Take 1 tablet (25 mg total) by mouth once daily. 90 tablet 0    tiZANidine (ZANAFLEX) 4 MG tablet Take 4 mg by mouth every 6 (six) hours as needed.       No current facility-administered medications on file prior to visit.     Physical Examination  Vitals:    09/19/24 1421   BP: 126/82   Pulse: 74   Temp: 98.6 °F (37 °C)     GENERAL:  female in no apparent distress and well developed and  well nourished  HEAD:  Normocephalic, without obvious abnormality, atraumatic  EYES: sclera anicteric, conjunctiva normochromic  EARS: normal TM's and external ear canals both ears  NOSE: pale and boggy, clear and copious discharge, turbinates swollen    OROPHARYNX: moist mucous membranes without erythema, exudates or petechiae, clear post-nasal drainage present  LYMPH NODES: normal, supple, no lymphadenopathy  LUNGS: clear to auscultation, no wheezes, rales or rhonchi, symmetric air entry.  HEART: normal rate, regular rhythm, normal S1, S2, no murmurs, rubs, clicks or gallops.  ABDOMEN: soft, nontender, nondistended, no masses or organomegaly.  MUSCULOSKELETAL: no gross joint deformity or swelling.  NEURO: alert, oriented, normal speech, no focal findings or movement disorder noted.  SKIN: normal coloration and turgor, no rashes, no suspicious skin lesions noted.     Assessment/Plan:   Problem List Items Addressed This Visit          ENT    Chronic rhinitis - Primary    Current Assessment & Plan     - Not controled at this time   - Ordered Flonase 1 SEN BID   - Ordered Astelin 1 SEN BID   - Educated on proper use of intranasal sprays  - Ordered Serum IgE to Zone 6 Aeroallergens   - Will continue to monitor and reassess          Relevant Orders    Allergen Profile, Zone 6    Recurrent sinus infections    Overview     - Patient with recurrent infections   - Patient reported 3-4 infections per years  - No hx of otitis media or PNA in adulthood   - No hx of sepsis or recurrent viral/fungal infection         Current Assessment & Plan     - Humoral evaluation at this time          Relevant Medications    pneumococcal vaccine (PNEUMOVAX-23) injection 0.5 mL (Completed)    Other Relevant Orders    HIV 1/2 Ag/Ab (4th Gen)    CBC Auto Differential    Streptococcus Pneumoniae IgG Antibody (23 Serotypes), MAID    IgG    IgM    IgA       Other    Drug reaction    Overview     - Mobic: Chest tightness, no urticaria   - ASA:  tachycardia   - Tolerates Naproxen and Ibuprofen          Current Assessment & Plan     - Not indication for oral challenge at this time           Follow up:  Follow up in about 6 weeks (around 10/31/2024).    MD [facility]ay[facility]  Allergy and Immunology

## 2024-09-19 NOTE — ASSESSMENT & PLAN NOTE
- Not controled at this time   - Ordered Flonase 1 SEN BID   - Ordered Astelin 1 SEN BID   - Educated on proper use of intranasal sprays  - Ordered Serum IgE to Zone 6 Aeroallergens   - Will continue to monitor and reassess

## 2024-09-20 LAB
HIV 1+2 AB+HIV1 P24 AG SERPL QL IA: NORMAL
IGA SERPL-MCNC: 527 MG/DL (ref 40–350)
IGG SERPL-MCNC: 1559 MG/DL (ref 650–1600)
IGM SERPL-MCNC: 88 MG/DL (ref 50–300)

## 2024-09-23 LAB
A ALTERNATA IGE QN: <0.1 KU/L
A FUMIGATUS IGE QN: <0.1 KU/L
ALLERGEN BOXELDER MAPLE TREE IGE: <0.1 KU/L
ALLERGEN MULBERRY TREE IGE: <0.1 KU/L
ALLERGEN PIGWEED IGE: <0.1 KU/L
ALLERGEN WALNUT TREE IGE: <0.1 KU/L
BERMUDA GRASS IGE QN: 0.5 KU/L
C HERBARUM IGE QN: <0.1 KU/L
CAT DANDER IGE QN: <0.1 KU/L
COMMON RAGWEED IGE QN: <0.1 KU/L
D FARINAE IGE QN: 0.2 KU/L
D PTERONYSS IGE QN: 0.18 KU/L
DEPRECATED TIMOTHY IGE RAST QL: ABNORMAL
DOG DANDER IGE QN: <0.1 KU/L
ELDER IGE QN: <0.1 KU/L
IGE: 102 IU/ML
MOUSE URINE PROT IGE QN: <0.1 KU/L
MT JUNIPER IGE QN: <0.1 KU/L
P NOTATUM IGE QN: <0.1 KU/L
PECAN/HICK TREE IGE QN: <0.1 KU/L
RAST ALLERGEN INTERPRETATION: ABNORMAL
RAST CLASS: ABNORMAL
ROACH IGE QN: <0.1 KU/L
SILVER BIRCH IGE QN: 0.21 KU/L
TIMOTHY IGE QN: 5.96 KU/L
WHITE ELM IGE QN: <0.1 KU/L
WHITE OAK IGE QN: <0.1 KU/L

## 2024-11-01 DIAGNOSIS — I10 PRIMARY HYPERTENSION: Primary | ICD-10-CM

## 2024-11-01 DIAGNOSIS — Z76.89 ENCOUNTER TO ESTABLISH CARE WITH NEW DOCTOR: ICD-10-CM

## 2024-11-04 ENCOUNTER — TELEPHONE (OUTPATIENT)
Dept: CARDIOLOGY | Facility: CLINIC | Age: 64
End: 2024-11-04
Payer: MEDICARE

## 2024-11-04 NOTE — TELEPHONE ENCOUNTER
LVM for pt to call back in regards to rescheduling appt.                   ----- Message from Tali sent at 11/4/2024  8:37 AM CST -----  Name of Who is Calling:MEGAN KNIGHT [[id number]]        What is the request in detail: pt is calling to reschedule her appointments she has on th 11/15 to the next available and will like a Friday if possible  please advise thank you         Can the clinic reply by [facility]:call back or my chart        What Number [facility] Back [facility]: Telephone Information:  Mobile          [phone]

## 2024-11-04 NOTE — TELEPHONE ENCOUNTER
LVM for pt to call back in regards to rescheduling appt.                   ----- Message from HubertTradeBlockava sent at 11/4/2024  8:59 AM CST -----  Contact: [phone]  Type:  Patient Returning Call    Who Called:MEGAN KNIGHT [[id number]]  Who Left Message for Patient:JEFF SIMON  Does the patient know what this is regarding?:missed a call from  your office  Would the patient rather a call back or a response via [facility]? Call back  Best Call Back Number: [phone]  Additional Information: [id number]

## 2024-11-14 ENCOUNTER — TELEPHONE (OUTPATIENT)
Dept: NEUROLOGY | Facility: CLINIC | Age: 64
End: 2024-11-14
Payer: MEDICARE

## 2024-11-14 NOTE — TELEPHONE ENCOUNTER
----- Message from Estela sent at 11/14/2024  8:11 AM CST -----  Contact: Pt [phone]  Would like to receive medical advice.    Would they like a call back or a response via [facility]:  call back     Additional information:  Pt said she received a text stating that there was a earlier appt available for tomorrow at 1:00.  I tried rescheduling her appt but there were no earlier dates available.  I tried explaining that someone may have taken the appt but she wanted to check with the office.  She said she accepted the earlier appt but it's not showing.

## 2024-11-15 ENCOUNTER — TELEPHONE (OUTPATIENT)
Dept: NEUROLOGY | Facility: CLINIC | Age: 64
End: 2024-11-15
Payer: MEDICARE

## 2024-11-15 ENCOUNTER — OFFICE VISIT (OUTPATIENT)
Dept: ALLERGY | Facility: CLINIC | Age: 64
End: 2024-11-15
Payer: MEDICARE

## 2024-11-15 ENCOUNTER — OFFICE VISIT (OUTPATIENT)
Dept: NEUROLOGY | Facility: CLINIC | Age: 64
End: 2024-11-15
Payer: MEDICARE

## 2024-11-15 VITALS
BODY MASS INDEX: 37.23 KG/M2 | HEIGHT: 63 IN | DIASTOLIC BLOOD PRESSURE: 75 MMHG | HEART RATE: 93 BPM | SYSTOLIC BLOOD PRESSURE: 122 MMHG | WEIGHT: 210.13 LBS

## 2024-11-15 VITALS
BODY MASS INDEX: 37.03 KG/M2 | OXYGEN SATURATION: 97 % | SYSTOLIC BLOOD PRESSURE: 117 MMHG | TEMPERATURE: 98 F | HEART RATE: 75 BPM | HEIGHT: 63 IN | DIASTOLIC BLOOD PRESSURE: 79 MMHG | WEIGHT: 209 LBS

## 2024-11-15 DIAGNOSIS — G43.719 INTRACTABLE CHRONIC MIGRAINE WITHOUT AURA AND WITHOUT STATUS MIGRAINOSUS: Primary | ICD-10-CM

## 2024-11-15 DIAGNOSIS — J30.1 SEASONAL ALLERGIC RHINITIS DUE TO POLLEN: Primary | ICD-10-CM

## 2024-11-15 DIAGNOSIS — J30.89 ALLERGIC RHINITIS DUE TO DUST MITE: ICD-10-CM

## 2024-11-15 DIAGNOSIS — T50.905D ADVERSE EFFECT OF DRUG, SUBSEQUENT ENCOUNTER: ICD-10-CM

## 2024-11-15 DIAGNOSIS — G44.229 CHRONIC TENSION-TYPE HEADACHE, NOT INTRACTABLE: ICD-10-CM

## 2024-11-15 DIAGNOSIS — E66.01 MORBID (SEVERE) OBESITY DUE TO EXCESS CALORIES: ICD-10-CM

## 2024-11-15 DIAGNOSIS — J32.9 RECURRENT SINUS INFECTIONS: ICD-10-CM

## 2024-11-15 PROBLEM — J31.0 CHRONIC RHINITIS: Status: RESOLVED | Noted: 2024-09-19 | Resolved: 2024-11-15

## 2024-11-15 PROCEDURE — 99999 PR PBB SHADOW E&M-EST. PATIENT-LVL V: CPT | Mod: PBBFAC,,, | Performed by: STUDENT IN AN ORGANIZED HEALTH CARE EDUCATION/TRAINING PROGRAM

## 2024-11-15 PROCEDURE — 99999 PR PBB SHADOW E&M-EST. PATIENT-LVL IV: CPT | Mod: PBBFAC,,, | Performed by: PSYCHIATRY & NEUROLOGY

## 2024-11-15 RX ORDER — FLUCONAZOLE 150 MG/1
150 TABLET ORAL ONCE
COMMUNITY

## 2024-11-15 RX ORDER — AMITRIPTYLINE HYDROCHLORIDE 25 MG/1
25 TABLET, FILM COATED ORAL NIGHTLY PRN
COMMUNITY

## 2024-11-15 RX ORDER — SEMAGLUTIDE 2.68 MG/ML
INJECTION, SOLUTION SUBCUTANEOUS
COMMUNITY
Start: 2023-10-15

## 2024-11-15 RX ORDER — PREDNISONE 10 MG/1
TABLET ORAL
Qty: 28 TABLET | Refills: 0 | Status: SHIPPED | OUTPATIENT
Start: 2024-11-15 | End: 2024-11-27

## 2024-11-15 RX ORDER — MULTIVITAMIN
1 TABLET ORAL DAILY
COMMUNITY

## 2024-11-15 RX ORDER — TOPIRAMATE 25 MG/1
25 TABLET ORAL 2 TIMES DAILY
Qty: 60 TABLET | Refills: 3 | Status: SHIPPED | OUTPATIENT
Start: 2024-11-15 | End: 2025-03-15

## 2024-11-15 RX ORDER — AMOXICILLIN AND CLAVULANATE POTASSIUM 875; 125 MG/1; MG/1
1 TABLET, FILM COATED ORAL EVERY 12 HOURS
Qty: 42 TABLET | Refills: 0 | Status: SHIPPED | OUTPATIENT
Start: 2024-11-15 | End: 2024-12-06

## 2024-11-15 RX ORDER — LOSARTAN POTASSIUM 100 MG/1
1 TABLET ORAL DAILY
COMMUNITY

## 2024-11-15 RX ORDER — MONTELUKAST SODIUM 10 MG/1
1 TABLET ORAL DAILY
COMMUNITY
Start: 2024-11-04

## 2024-11-15 RX ORDER — LINACLOTIDE 290 UG/1
1 CAPSULE, GELATIN COATED ORAL DAILY
COMMUNITY

## 2024-11-15 RX ORDER — AMLODIPINE BESYLATE 10 MG/1
1 TABLET ORAL DAILY
COMMUNITY

## 2024-11-15 RX ORDER — ONDANSETRON 4 MG/1
4 TABLET, ORALLY DISINTEGRATING ORAL ONCE
COMMUNITY

## 2024-11-15 RX ORDER — DESLORATADINE 5 MG/1
5 TABLET ORAL DAILY
Qty: 90 TABLET | Refills: 3 | Status: SHIPPED | OUTPATIENT
Start: 2024-11-15 | End: 2025-11-15

## 2024-11-15 NOTE — PROGRESS NOTES
"Subjective:      Patient ID: Sobia Jacobo is a 64 y.o. female.    Chief Complaint: Headaches.     HPI 64 Years old AA Female with PMHx of  HTN / CHRISSY / Pre Diabetes and others Medical issues  came with Sister for the evaluation and recommendation of Headaches.      Started: about > 10 years.   Describes: pressure / pulsating.    Timin to 6 hours.   Frequency: Daily - tension type / Migraines 1 every other week.     Pain:  5 to 9/ 10.   Location: Left Frontal.   Family: Sister / Mother / Nephew with HA's.    Medications: NSAID's / Norco / Zofran / Elavil / Gabapentin.   Worsen: lights / physical activities.   Alleviated: Dark quiet room.   Associated symptoms: phono /photophobia / Neck tension / visual " colorful floaters " / nauseas.  Triggers: Cheeses.   Prodrome symptoms: none.   Auras: none.               Referral by ENT.         No ER visit with the HA's since .         No Head / Neck trauma or Surgery .         Wake up with the HA's.     Review of Systems   Neurological:  Positive for headaches.   All other systems reviewed and are negative.    Objective:     Neurological Exam  Mental Status  Alert. Oriented to person, place, time and situation. Recent and remote memory are intact. Able to copy figure. Clock drawing is normal. Speech is normal. Language is fluent with no aphasia. Attention and concentration are normal. Fund of knowledge is appropriate for level of education. Apraxia absent.    Cranial Nerves  CN I: Sense of smell is normal.  CN II: Visual acuity is normal. Visual fields full to confrontation.  CN III, IV, VI: Extraocular movements intact bilaterally. Normal lids and orbits bilaterally. Pupils equal round and reactive to light bilaterally.  CN V: Facial sensation is normal.  CN VII: Full and symmetric facial movement.  CN VIII: Hearing is normal.  CN IX, X: Palate elevates symmetrically. Normal gag reflex.  CN XI: Shoulder shrug strength is normal.  CN XII: Tongue midline without " atrophy or fasciculations.    Motor  Normal muscle bulk throughout. No fasciculations present. Normal muscle tone. No abnormal involuntary movements. Strength is 5/5 throughout all four extremities.    Sensory  Sensation is intact to light touch, pinprick, vibration and proprioception in all four extremities.    Reflexes                                            Right                      Left  Brachioradialis                    2+                         2+  Biceps                                 2+                         2+  Triceps                                2+                         2+  Finger flex                           2+                         2+  Hamstring                            2+                         2+  Patellar                                2+                         2+  Achilles                                2+                         2+  Right Plantar: downgoing  Left Plantar: downgoing  Jaw jerk absent.  Right pathological reflexes: Charanjit's absent. Ankle clonus absent.  Left pathological reflexes: Charanjit's absent. Ankle clonus absent.  Glabellar tap absent. Snout absent. Right palmomental absent. Left palmomental absent. Right palmar grasp absent. Left palmar grasp absent.    Coordination    Finger-to-nose, rapid alternating movements and heel-to-shin normal bilaterally without dysmetria.    Gait  Normal casual, toe, heel and tandem gait.    Physical Exam  Vitals and nursing note reviewed.   Constitutional:       Appearance: Normal appearance.   HENT:      Head: Normocephalic and atraumatic.      Right Ear: Tympanic membrane normal.      Left Ear: Tympanic membrane normal.      Nose: Nose normal.      Mouth/Throat:      Mouth: Mucous membranes are moist.      Pharynx: Oropharynx is clear.   Eyes:      General: Lids are normal.      Extraocular Movements: Extraocular movements intact.      Conjunctiva/sclera: Conjunctivae normal.      Pupils: Pupils are equal, round, and  reactive to light.   Cardiovascular:      Rate and Rhythm: Normal rate and regular rhythm.      Pulses: Normal pulses.      Heart sounds: Normal heart sounds.   Pulmonary:      Effort: Pulmonary effort is normal.   Abdominal:      General: Abdomen is flat. Bowel sounds are normal.      Palpations: Abdomen is soft.   Genitourinary:     Comments: Deferred.   Musculoskeletal:         General: Normal range of motion.      Cervical back: Normal range of motion and neck supple.   Skin:     General: Skin is warm and dry.      Capillary Refill: Capillary refill takes less than 2 seconds.   Neurological:      Mental Status: She is alert. Mental status is at baseline.      Motor: Motor strength is normal.     Coordination: Coordination is intact.      Deep Tendon Reflexes:      Reflex Scores:       Tricep reflexes are 2+ on the right side and 2+ on the left side.       Bicep reflexes are 2+ on the right side and 2+ on the left side.       Brachioradialis reflexes are 2+ on the right side and 2+ on the left side.       Patellar reflexes are 2+ on the right side and 2+ on the left side.       Achilles reflexes are 2+ on the right side and 2+ on the left side.  Psychiatric:         Mood and Affect: Mood normal.         Speech: Speech normal.         Behavior: Behavior normal.         Thought Content: Thought content normal.         Judgment: Judgment normal.        Assessment:    64 Years old AA Female with PMHX as above came of the evaluation of Headaches.   - Chronic Migraines Headaches.  - Chronic Tension Headaches.   - Obesity.   Plan:   Patient Neurological Assessment is non focal.     Obesity: discussed diet / exercise / life style changes.     No Hx of Kidney stones.  Add Topamax 25 mg PO BID.   SE discussed.     Elavil PRN for Anxiety.     Labs: HIV     Personally reviewed CT Sinuses: 04/ 2024 - normal.     RTC: 3 months.     Please do not hesitate to contact me with any updates, questions or concerns.    I spent a total  of 45 minutes on the day of the visit.This includes face to face time and non-face to face time preparing to see the patient (eg, review of tests),   obtaining and/or reviewing separately obtained history, documenting clinical information in the electronic or other health record,   independently interpreting results and communicating results to the patient/family/caregiver, or care coordinator.    Lionel Singh MD.  General Neurologist.

## 2024-11-15 NOTE — PROGRESS NOTES
Allergy and Immunology  Established Patient Clinic Note    Date: 11/15/2024  No chief complaint on file.    History  Sobia Jacobo is a 64 y.o. female being seen for follow-up today.    Allergic Rhinitis due to dust mites and tree/grass pollen   - Discussed labs and environmental controls      Drug Allergy  - Mobic: Chest tightness, no urticaria   - ASA: tachycardia      Recurrent Infections  - Acute sinus infection at this time   - Concern for otogenic etiology - left sided     Initial HPI:   - Patient with recurrent infections   - Patient reported 3-4 infections per years  - No hx of otitis media or PNA in adulthood   - No hx of sepsis or recurrent viral/fungal infection    Allergies, PMH, PSH, Social, and Family History were reviewed.    Current Outpatient Medications on File Prior to Visit   Medication Sig Dispense Refill    albuterol 90 mcg/actuation inhaler Inhale 2 puffs into the lungs every 4 (four) hours as needed. 1 Inhaler 2    amitriptyline (ELAVIL) 25 MG tablet Take 25 mg by mouth nightly as needed.      amLODIPine (NORVASC) 10 MG tablet Take 1 tablet by mouth once daily.      atorvastatin (LIPITOR) 40 MG tablet       azelastine (ASTELIN) 137 mcg (0.1 %) nasal spray 1 spray (137 mcg total) by Nasal route 2 (two) times daily. 90 mL 2    ergocalciferol (ERGOCALCIFEROL) 50,000 unit Cap Take 50,000 Units by mouth every 7 days.      fluconazole (DIFLUCAN) 150 MG Tab Take 150 mg by mouth once.      fluticasone (FLONASE SENSIMIST) 27.5 mcg/actuation nasal spray 2 sprays by Nasal route once daily. 9.1 mL 6    fluticasone propionate (FLONASE) 50 mcg/actuation nasal spray 1 spray (50 mcg total) by Each Nostril route 2 (two) times a day. 16 g 11    furosemide (LASIX) 40 MG tablet Take 1 tablet (40 mg total) by mouth 2 (two) times daily. 60 tablet 3    gabapentin (NEURONTIN) 300 MG capsule Take 300 mg by mouth 3 (three) times daily.      hydrocodone-acetaminophen 10-325mg (NORCO)  mg Tab Take by mouth.       LINZESS 290 mcg Cap capsule Take 1 capsule by mouth once daily.      losartan (COZAAR) 100 MG tablet Take 1 tablet by mouth once daily.      losartan-hydrochlorothiazide 100-25 mg (HYZAAR) 100-25 mg per tablet Take 1 tablet by mouth once daily.      montelukast (SINGULAIR) 10 mg tablet Take 1 tablet by mouth once daily.      multivitamin with folic acid 400 mcg Tab Take 1 tablet by mouth once daily.      ondansetron (ZOFRAN-ODT) 4 MG TbDL Take 4 mg by mouth once.      pantoprazole (PROTONIX) 20 MG tablet Take 1 tablet (20 mg total) by mouth once daily. 30 tablet 0    potassium chloride SA (K-DUR,KLOR-CON) 10 MEQ tablet Take 20 mEq by mouth 2 (two) times daily.      metoprolol succinate (TOPROL-XL) 25 MG 24 hr tablet Take 1 tablet (25 mg total) by mouth once daily. 90 tablet 0    [DISCONTINUED] brompheniramine-pseudoephedrine-dextromethorphan (DIMETAPP DM) 1-15-5 mg/5 mL Elix Take by mouth every 6 (six) hours as needed.      [DISCONTINUED] cefPROZIL (CEFZIL) 500 MG tablet       [DISCONTINUED] cetirizine-pseudoephedrine 5-120 mg Tb12 Take by mouth.      [DISCONTINUED] clonazePAM (KLONOPIN) 0.5 MG tablet Take 0.5 mg by mouth every evening.      [DISCONTINUED] metformin (GLUCOPHAGE) 500 MG tablet Take 1 tablet (500 mg total) by mouth 2 (two) times daily with meals. 60 tablet 6    [DISCONTINUED] tiZANidine (ZANAFLEX) 4 MG tablet Take 4 mg by mouth every 6 (six) hours as needed.       No current facility-administered medications on file prior to visit.     Physical Examination  Vitals:    11/15/24 1046   BP: 117/79   Pulse: 75   Temp: 97.9 °F (36.6 °C)     GENERAL:  female in no apparent distress and well developed and well nourished  HEAD:  Normocephalic, without obvious abnormality, atraumatic  EYES: sclera anicteric, conjunctiva normochromic  EARS: normal TM's and external ear canals both ears  NOSE: without erythema or discharge, clear discharge, turbinates normal    OROPHARYNX: moist mucous membranes without  erythema, exudates or petechiae  LYMPH NODES: normal, supple, no lymphadenopathy  LUNGS: clear to auscultation, no wheezes, rales or rhonchi, symmetric air entry.  HEART: normal rate, regular rhythm, normal S1, S2, no murmurs, rubs, clicks or gallops.  ABDOMEN: soft, nontender, nondistended, no masses or organomegaly.  MUSCULOSKELETAL: no gross joint deformity or swelling.  NEURO: alert, oriented, normal speech, no focal findings or movement disorder noted.  SKIN: normal coloration and turgor, no rashes, no suspicious skin lesions noted.     Assessment/Plan:   Problem List Items Addressed This Visit       Recurrent sinus infections    Overview     - Patient with recurrent infections   - Patient reported 3-4 infections per years  - No hx of otitis media or PNA in adulthood   - No hx of sepsis or recurrent viral/fungal infection         Current Assessment & Plan     - Acute infection at this time   - Ordered Prednisone and Augmentin   - ED precautions discussed   - Will continue to monitor and reassess          Relevant Medications    predniSONE (DELTASONE) 10 MG tablet    amoxicillin-clavulanate 875-125mg (AUGMENTIN) 875-125 mg per tablet    Drug reaction    Overview     - Mobic: Chest tightness, no urticaria   - ASA: tachycardia   - Tolerates Naproxen and Ibuprofen          Seasonal allergic rhinitis due to pollen - Primary    Overview     - 09/23/2024: Serum IgE for Zone 6 Aeroallergens positive to dust mites and tree/grass pollen          Relevant Medications    desloratadine (CLARINEX) 5 mg tablet    Allergic rhinitis due to dust mite    Overview     - 09/23/2024: Serum IgE for Zone 6 Aeroallergens positive to dust mites and tree/grass pollen          Relevant Medications    desloratadine (CLARINEX) 5 mg tablet     Follow up:  Follow up in about 3 months (around 2/15/2025).    Kelechi Martinez MD   [facility]  Allergy and Immunology

## 2024-11-15 NOTE — ASSESSMENT & PLAN NOTE
- Acute infection at this time   - Ordered Prednisone and Augmentin   - ED precautions discussed   - Will continue to monitor and reassess

## 2024-11-15 NOTE — TELEPHONE ENCOUNTER
----- Message from Fiordaliza sent at 11/15/2024 11:24 AM CST -----  Contact: Sobia  .Patient is calling to speak with the nurse regarding appt today  . Reports wanting to know if the pt can come in earlier due to the pt having a long drive back home . Please give patient a call back at   [phone]

## 2025-01-14 ENCOUNTER — TELEPHONE (OUTPATIENT)
Facility: CLINIC | Age: 65
End: 2025-01-14
Payer: MEDICARE

## 2025-01-14 NOTE — TELEPHONE ENCOUNTER
----- Message from Rianna sent at 1/14/2025 11:36 AM CST -----  Contact: [phone] Patient  Pt is calling in regards to her appointment on 01/22/2025. Pt would like to reschedule the appointment to 02/18/2025. Pt would like it to be before or after she sees Allergy but would like it the same day please. Please call and advise. Thank you

## 2025-01-14 NOTE — TELEPHONE ENCOUNTER
Called pt and spoke with her and sister both on phone. Requested to reschedule due to potential weather on date of appt. Advised that there were no other appts available to get them both scheduled back to back, as they will be coming together. They stated to leave the appts as is, and they will call us the day before to reschedule if need be.

## 2025-01-15 ENCOUNTER — TELEPHONE (OUTPATIENT)
Dept: NEUROLOGY | Facility: CLINIC | Age: 65
End: 2025-01-15
Payer: MEDICARE

## 2025-01-15 NOTE — TELEPHONE ENCOUNTER
Spoke with patient and informed her the next available is in July. Pt stated she will call back with her sister to reschedule for both of them

## 2025-01-15 NOTE — TELEPHONE ENCOUNTER
----- Message from [employer] sent at 1/15/2025 10:13 AM CST -----  Contact: self  ..Type:  Sooner Apoointment Request    Caller is requesting a sooner appointment.  Caller declined first available appointment listed below.  Caller will not accept being placed on the waitlist and is requesting a message be sent to doctor.  Name of Caller:.Sobia Jacobo  When is the first available appointment?  Symptoms:  Would the patient rather a call back or a response via [facility]? Call back  Best Call Back Number:.[phone]  Additional Information: Pt is calling to reschedule appt on 1/22/25 due to the weather

## 2025-01-16 ENCOUNTER — TELEPHONE (OUTPATIENT)
Dept: ALLERGY | Facility: CLINIC | Age: 65
End: 2025-01-16
Payer: MEDICARE

## 2025-01-19 NOTE — PROGRESS NOTES
Subjective:       Patient ID: Sobia Jacobo is a 65 y.o. female.    Chief Complaint: No chief complaint on file.    HPI The patient presented on 11/ 2024 for evaluation of Migraines Headaches.  New issues: none.   Headaches: continue daily.  Topamax: stopped it due to SE.       Review of Systems      Headaches.     Current Outpatient Medications:     albuterol 90 mcg/actuation inhaler, Inhale 2 puffs into the lungs every 4 (four) hours as needed., Disp: 1 Inhaler, Rfl: 2    amitriptyline (ELAVIL) 25 MG tablet, Take 25 mg by mouth nightly as needed., Disp: , Rfl:     amLODIPine (NORVASC) 10 MG tablet, Take 1 tablet by mouth once daily., Disp: , Rfl:     atorvastatin (LIPITOR) 40 MG tablet, , Disp: , Rfl:     azelastine (ASTELIN) 137 mcg (0.1 %) nasal spray, 1 spray (137 mcg total) by Nasal route 2 (two) times daily., Disp: 90 mL, Rfl: 2    desloratadine (CLARINEX) 5 mg tablet, Take 1 tablet (5 mg total) by mouth once daily., Disp: 90 tablet, Rfl: 3    ergocalciferol (ERGOCALCIFEROL) 50,000 unit Cap, Take 50,000 Units by mouth every 7 days., Disp: , Rfl:     fluconazole (DIFLUCAN) 150 MG Tab, Take 150 mg by mouth once., Disp: , Rfl:     fluticasone (FLONASE SENSIMIST) 27.5 mcg/actuation nasal spray, 2 sprays by Nasal route once daily., Disp: 9.1 mL, Rfl: 6    fluticasone propionate (FLONASE) 50 mcg/actuation nasal spray, 1 spray (50 mcg total) by Each Nostril route 2 (two) times a day., Disp: 16 g, Rfl: 11    furosemide (LASIX) 40 MG tablet, Take 1 tablet (40 mg total) by mouth 2 (two) times daily., Disp: 60 tablet, Rfl: 3    gabapentin (NEURONTIN) 300 MG capsule, Take 300 mg by mouth 3 (three) times daily., Disp: , Rfl:     hydrocodone-acetaminophen 10-325mg (NORCO)  mg Tab, Take by mouth., Disp: , Rfl:     LINZESS 290 mcg Cap capsule, Take 1 capsule by mouth once daily., Disp: , Rfl:     losartan (COZAAR) 100 MG tablet, Take 1 tablet by mouth once daily., Disp: , Rfl:     losartan-hydrochlorothiazide 100-25  mg (HYZAAR) 100-25 mg per tablet, Take 1 tablet by mouth once daily., Disp: , Rfl:     montelukast (SINGULAIR) 10 mg tablet, Take 1 tablet by mouth once daily., Disp: , Rfl:     multivitamin with folic acid 400 mcg Tab, Take 1 tablet by mouth once daily., Disp: , Rfl:     ondansetron (ZOFRAN-ODT) 4 MG TbDL, Take 4 mg by mouth once., Disp: , Rfl:     OZEMPIC 2 mg/dose (8 mg/3 mL) PnIj, INJECT 2MG UNDER THE SKIN ONE TIME WEEKLY, Disp: , Rfl:     pantoprazole (PROTONIX) 20 MG tablet, Take 1 tablet (20 mg total) by mouth once daily., Disp: 30 tablet, Rfl: 0    potassium chloride SA (K-DUR,KLOR-CON) 10 MEQ tablet, Take 20 mEq by mouth 2 (two) times daily., Disp: , Rfl:     topiramate (TOPAMAX) 25 MG tablet, Take 1 tablet (25 mg total) by mouth 2 (two) times daily., Disp: 60 tablet, Rfl: 3    Past Medical History:   Diagnosis Date    Fatty liver     GERD (gastroesophageal reflux disease)     Hypertension     Prediabetes     Seasonal allergies     Sleep apnea      Past Surgical History:   Procedure Laterality Date    breast reduction      HYSTERECTOMY      ROTATOR CUFF REPAIR       Social History     Socioeconomic History    Marital status: Single   Occupational History    Occupation:      Employer: [employer]   Tobacco Use    Smoking status: Light Smoker     Current packs/day: 0.50     Average packs/day: 0.5 packs/day for 4.0 years (2.0 ttl pk-yrs)     Types: Cigarettes    Tobacco comments:     quit smoking 2011   Substance and Sexual Activity    Alcohol use: Yes     Alcohol/week: 7.0 standard drinks of alcohol     Types: 7 Glasses of wine per week     Comment: 1 wine cooler a nite    Drug use: No    Sexual activity: Never     Social Drivers of Health     Financial Resource Strain: Unknown (11/5/2018)    Received from [facility] and Its Subsidiaries and Affiliates    Overall Financial Resource Strain (CARDIA)     Difficulty of Paying Living Expenses: Patient  declined   Food Insecurity: Unknown (11/5/2018)    [facility]    Hunger Vital Sign     Worried About Running Out of Food in the Last Year: Patient declined     Ran Out of Food in the Last Year: Patient declined   Transportation Needs: Unknown (11/5/2018)    [facility]    PRAPARE - Transportation     Lack of Transportation (Medical): Patient declined     Lack of Transportation (Non-Medical): Patient declined   Physical Activity: Unknown (11/5/2018)    Received from [facility] and Its [facility] and [facility]    Exercise Vital Sign     Days of Exercise per Week: Patient declined     Minutes of Exercise per Session: Patient declined   Stress: Unknown (11/5/2018)    Received from [facility]    [facility] [place] of Occupational Health - Occupational Stress Questionnaire     Feeling of Stress : Patient declined     Past/Current Medical/Surgical History, Past/Current Social History,   Past/Current Family History and Past/Current Medications were reviewed in detail.    Objective:     VITAL SIGNS WERE REVIEWED    GENERAL APPEARANCE:     The patient looks comfortable.    BMI    No signs of respiratory distress.    Normal breathing pattern.    No dysmorphic features    Normal eye contact.       GENERAL MEDICAL EXAM:    HEENT:  Head is atraumatic normocephalic.     FUNDOSCOPIC (OPHTHALMOSCOPIC) EXAMINATION showed no disc edema (papilledema).      NECK: No JVD. No visible lesions or goiters.     CHEST-CARDIOPULMONARY: No cyanosis. No tachypnea. Normal respiratory effort.    [id number]-[id number]: No jaundice. No stomas or lesions. No visible hernias. No catheters.     SKIN, HAIR, NAILS: No pathognomonic skin rash.No  neurofibromatosis. No visible lesions.  No stigmata of autoimmune disease. No clubbing.    LIMBS: No varicose veins. No visible swelling.    MUSCULOSKELETAL: No visible deformities.No visible lesions.    Neurological Exam      Head Tremors noticed.     Lab Results   Component Value Date    WBC 5.74 09/19/2024    HGB 11.9 (L) 09/19/2024    HCT 38.6 09/19/2024    MCV 87 09/19/2024     09/19/2024     Sodium   Date Value Ref Range Status   04/23/2018 141 136 - 145 mmol/L Final     Potassium   Date Value Ref Range Status   04/23/2018 4.2 3.5 - 5.1 mmol/L Final     Chloride   Date Value Ref Range Status   04/23/2018 104 95 - 110 mmol/L Final     CO2   Date Value Ref Range Status   04/23/2018 26 23 - 29 mmol/L Final     Glucose   Date Value Ref Range Status   04/23/2018 79 70 - 110 mg/dL Final     BUN   Date Value Ref Range Status   04/23/2018 13 6 - 20 mg/dL Final     Creatinine   Date Value Ref Range Status   04/23/2018 0.8 0.5 - 1.4 mg/dL Final     Calcium   Date Value Ref Range Status   04/23/2018 9.6 8.7 - 10.5 mg/dL Final     Total Protein   Date Value Ref Range Status   04/23/2018 8.3 6.0 - 8.4 g/dL Final     Albumin   Date Value Ref Range Status   04/23/2018 3.3 (L) 3.5 - 5.2 g/dL Final     Total Bilirubin   Date Value Ref Range Status   04/23/2018 0.7 0.1 - 1.0 mg/dL Final     Comment:     For infants and newborns, interpretation of results should be based  on gestational age, weight and in agreement with clinical  observations.  Premature Infant recommended reference ranges:  Up to 24 hours.............<8.0 mg/dL  Up to 48 hours............<12.0 mg/dL  3-5 days..................<15.0 mg/dL  6-29 days.................<15.0 mg/dL       Alkaline Phosphatase   Date Value Ref Range Status   04/23/2018 113 55 - 135 U/L Final     AST   Date Value Ref Range Status   04/23/2018 63 (H) 10 - 40 U/L Final     ALT   Date Value Ref Range Status   04/23/2018 46 (H) 10 - 44 U/L Final     Anion Gap   Date Value Ref Range  Status   04/23/2018 11 8 - 16 mmol/L Final     eGFR if    Date Value Ref Range Status   04/23/2018 >60.0 >60 mL/min/1.73 m^2 Final     eGFR if non    Date Value Ref Range Status   04/23/2018 >60.0 >60 mL/min/1.73 m^2 Final     Comment:     Calculation used to obtain the estimated glomerular filtration  rate (eGFR) is the CKD-EPI equation.        Lab Results   Component Value Date    QTBGSENM05 942 (H) 06/11/2008     Lab Results   Component Value Date    TSH 0.733 02/27/2015    Z9BWRRQ 114 02/26/2010    E1CHTLN 8.5 02/26/2010     No results found in the last 24 hours.    LABORATORY EVALUATION    RADIOLOGY EVALUATION     NEUROPHYSIOLOGY EVALUATION     PATHOLOGY EVALUATION        NEUROCOGNITIVE AND NEUROPSYCHOLOGY EVALUATION     Reviewed the neuroimaging independently     Assessment:    64 Years old AA Female with PMHX as above came of the evaluation of Headaches.   - Chronic Migraines Headaches.  - Chronic Tension Headaches.   - Obesity.   Plan:   Patient indicated stopped the Topamax due tp positive SE - dizziness / drowsiness through the day -  Did help with the Headaches frequency dropping to abut 50 %.   D/c Topamax.    Has been having daily headaches - wake up with them - moderate for the most part.  She is not using the CPaP since 2018 - lost some weight, we recommended to go   Back to the Sleep clinic for evaluation - She agreed and understood / headaches can be caused by the CHRISSY.  Ambulatory referral to Sleep Clinic.    Discussed switching Losartan for Inderal - will cover HTN / HA's and Tremors.   She will discuss it with PCP next week and let us know.     Obesity: discussed diet / exercise / life style changes / Ozempic - lost 5 to 10 lbs in the last few weeks / follow by PCP.   Eyes Clinic: 04/ 2024 - no issues according to Patient.    D/c Topamax ( undesirable SE ) and Elavil ( weight gain ).       Labs: HIV - non reactive.      Personally reviewed CT Sinuses: 04/ 2024 -  normal.     MEDICAL/SURGICAL COMORBIDITIES     All relevant medical comorbidities noted and managed by primary care physician and medical care team.      HEALTHY LIFESTYLE AND PREVENTATIVE CARE    The patient to adhere to the age-appropriate health maintenance guidelines including screening tests and vaccinations. The patient to adhere to  healthy lifestyle, optimal weight, exercise, healthy diet,   good sleep hygiene and avoiding drugs including smoking, alcohol and recreational drugs.    RTC: 3 months.     Please do not hesitate to contact me with any updates, questions or concerns.    I spent a total of 30 minutes on the day of the visit.This includes face to face time and non-face to face time preparing to see the patient (eg, review of tests), obtaining and/or reviewing separately obtained history, documenting clinical information in the electronic or other health record, independently interpreting results and communicating results to the patient/family/caregiver, or care coordinator.    Lionel Santiago MD  General Neurology.

## 2025-01-22 ENCOUNTER — OFFICE VISIT (OUTPATIENT)
Dept: NEUROLOGY | Facility: CLINIC | Age: 65
End: 2025-01-22
Payer: MEDICARE

## 2025-01-22 DIAGNOSIS — E66.01 MORBID (SEVERE) OBESITY DUE TO EXCESS CALORIES: ICD-10-CM

## 2025-01-22 DIAGNOSIS — G43.719 INTRACTABLE CHRONIC MIGRAINE WITHOUT AURA AND WITHOUT STATUS MIGRAINOSUS: ICD-10-CM

## 2025-01-22 DIAGNOSIS — G44.229 CHRONIC TENSION-TYPE HEADACHE, NOT INTRACTABLE: ICD-10-CM

## 2025-01-22 DIAGNOSIS — G47.33 OSA (OBSTRUCTIVE SLEEP APNEA): Primary | ICD-10-CM

## 2025-01-24 ENCOUNTER — TELEPHONE (OUTPATIENT)
Dept: NEUROLOGY | Facility: CLINIC | Age: 65
End: 2025-01-24
Payer: MEDICARE

## 2025-01-24 NOTE — TELEPHONE ENCOUNTER
----- Message from Susan sent at 1/22/2025  8:48 AM CST -----  Contact: [phone]  .[email or web address]     Patient is calling for Medical Advice regarding:virtual appt for today     How long has patient had these symptoms:    Pharmacy name and phone#:    Patient wants a call back or thru [facility]:call back     Comments:  Pt is calling she states she has a virtual this morning at 10:30 and she states she is in [place] and they can not leave so she needs to know if she can still do the appt   Please advise patient replies from provider may take up to 48 hours.

## 2025-01-28 ENCOUNTER — PATIENT MESSAGE (OUTPATIENT)
Dept: NEUROLOGY | Facility: CLINIC | Age: 65
End: 2025-01-28
Payer: MEDICARE

## 2025-02-12 ENCOUNTER — TELEPHONE (OUTPATIENT)
Dept: ALLERGY | Facility: CLINIC | Age: 65
End: 2025-02-12
Payer: MEDICARE

## 2025-02-12 NOTE — TELEPHONE ENCOUNTER
Left message to call.    ----- Message from Nurse Daniel sent at 1/17/2025 12:41 PM CST -----  Regarding: refill request.  Contact: Sobia Armando    ----- Message -----  From: Alejandro Felton  Sent: 1/17/2025  12:32 PM CST  To: Juan Lorenz Staff    Type:  RX Refill Request    Who Called: Sobia armando  Refill or New Rx:refill  RX Name and Strength:Erythromycin and Prednisone  How is the patient currently taking it? (ex. 1XDay):  Is this a 30 day or 90 day RX:  Preferred Pharmacy with phone number:  [facility] - [address] [address]  Phone: [phone] Fax: [phone]      Local or Mail Order:[facility]  Ordering Provider:juan  Would the patient rather a call back or a response via [facility]? call  Best Call Back Number:[phone]   Additional Information: Pt is asking for a call back to let her know if the prescriptions will be sent in. Pt stated she has a sinus infection

## 2025-02-18 ENCOUNTER — PATIENT MESSAGE (OUTPATIENT)
Dept: NEUROLOGY | Facility: CLINIC | Age: 65
End: 2025-02-18
Payer: MEDICARE

## 2025-02-19 NOTE — PROGRESS NOTES
Subjective:       Patient ID: Sobia Jacobo is a 65 y.o. female.    Chief Complaint: No chief complaint on file.    HPI The patient presented on 01/ 2025 for evaluation of Migraines Headaches.  New issues: none.   Headaches: Still having Headaches. Frequency waking up with them.   Topamax: stopped.     The originating site (patient location) is: Home.    The distant site (neurologist location) is: Neurology Clinic at [facility].    The chief complaint leading to consultation is: Chronic headaches.     Visit type: Virtual visit with synchronous audio and video.    Consent: The patient verbally consented to participating in the video visit and informed that may   decline to receive medical services by telemedicine and may withdraw from such care at any time.    I discussed with the patient the nature of our telemedicine visits, that:    I  would evaluate the patient and recommend diagnostics and treatments based on my assessment.    Our sessions are not being recorded and that personal health information is protected.    Our team would provide follow up care in person if/when the patient needs it.    Virtual (video/telemedicine) visits have significant limitations.   A telemedicine exam is primarily focused on the history and what I can observe.   Several critical parts of the neurological exam cannot be performed.     Review of Systems      Daily Headaches.    Current Medications[1]    Past Medical History:   Diagnosis Date    Fatty liver     GERD (gastroesophageal reflux disease)     Hypertension     Prediabetes     Seasonal allergies     Sleep apnea      Past Surgical History:   Procedure Laterality Date    breast reduction      HYSTERECTOMY      ROTATOR CUFF REPAIR       Social History[2]    Past/Current Medical/Surgical History, Past/Current Social History,   Past/Current Family History and Past/Current Medications were reviewed in detail.    Objective:     GENERAL APPEARANCE:     The patient looks  comfortable.    No signs of respiratory distress.    Normal breathing pattern.    No dysmorphic features    Normal eye contact.     GENERAL MEDICAL EXAM:    HEENT:  Head is atraumatic normocephalic.      Neck and Axillae: No JVD. No visible lesions.    Cardiopulmonary: No cyanosis. No tachypnea. Normal respiratory effort.    Gastrointestinal/Urogenital:  No jaundice. No stomas or lesions. No visible hernias. No catheters.     Skin, Hair and Nails: No pathognonomic skin rash. No neurofibromatosis. No visible lesions.  No stigmata of autoimmune disease. No clubbing.    Limbs: No varicose veins. No visible swelling.    Muskoskeletal: No visible deformities.No visible lesions.    Neurological Exam    Grossly unchanged Neuro.     Lab Results   Component Value Date    WBC 5.74 09/19/2024    HGB 11.9 (L) 09/19/2024    HCT 38.6 09/19/2024    MCV 87 09/19/2024     09/19/2024     Sodium   Date Value Ref Range Status   04/23/2018 141 136 - 145 mmol/L Final     Potassium   Date Value Ref Range Status   04/23/2018 4.2 3.5 - 5.1 mmol/L Final     Chloride   Date Value Ref Range Status   04/23/2018 104 95 - 110 mmol/L Final     CO2   Date Value Ref Range Status   04/23/2018 26 23 - 29 mmol/L Final     Glucose   Date Value Ref Range Status   04/23/2018 79 70 - 110 mg/dL Final     BUN   Date Value Ref Range Status   04/23/2018 13 6 - 20 mg/dL Final     Creatinine   Date Value Ref Range Status   04/23/2018 0.8 0.5 - 1.4 mg/dL Final     Calcium   Date Value Ref Range Status   04/23/2018 9.6 8.7 - 10.5 mg/dL Final     Total Protein   Date Value Ref Range Status   04/23/2018 8.3 6.0 - 8.4 g/dL Final     Albumin   Date Value Ref Range Status   04/23/2018 3.3 (L) 3.5 - 5.2 g/dL Final     Total Bilirubin   Date Value Ref Range Status   04/23/2018 0.7 0.1 - 1.0 mg/dL Final     Comment:     For infants and newborns, interpretation of results should be based  on gestational age, weight and in agreement with  "clinical  observations.  Premature Infant recommended reference ranges:  Up to 24 hours.............<8.0 mg/dL  Up to 48 hours............<12.0 mg/dL  3-5 days..................<15.0 mg/dL  6-29 days.................<15.0 mg/dL       Alkaline Phosphatase   Date Value Ref Range Status   04/23/2018 113 55 - 135 U/L Final     AST   Date Value Ref Range Status   04/23/2018 63 (H) 10 - 40 U/L Final     ALT   Date Value Ref Range Status   04/23/2018 46 (H) 10 - 44 U/L Final     Anion Gap   Date Value Ref Range Status   04/23/2018 11 8 - 16 mmol/L Final     eGFR if    Date Value Ref Range Status   04/23/2018 >60.0 >60 mL/min/1.73 m^2 Final     eGFR if non    Date Value Ref Range Status   04/23/2018 >60.0 >60 mL/min/1.73 m^2 Final     Comment:     Calculation used to obtain the estimated glomerular filtration  rate (eGFR) is the CKD-EPI equation.        Lab Results   Component Value Date    XPJYZNSG60 942 (H) 06/11/2008     Lab Results   Component Value Date    TSH 0.733 02/27/2015    T2RVVDN 114 02/26/2010    U2RNFFY 8.5 02/26/2010     No results found in the last 24 hours.    LABORATORY EVALUATION    RADIOLOGY EVALUATION     NEUROPHYSIOLOGY EVALUATION     PATHOLOGY EVALUATION      NEUROCOGNITIVE AND NEUROPSYCHOLOGY EVALUATION     Reviewed the neuroimaging independently     Assessment:    64 Years old AA Female with PMHX as above came of the evaluation of Headaches.   - Chronic Migraines Headaches.  - Chronic Tension Headaches.   - Obesity.   Plan:   She has not been able to see the Sleep clinic ( external ) / having problems " with some papers ", will reorder to be seen here.  Ambulatory referral to Sleep Clinic.   Increase Inderal 60 mg to 80 mg Po Q Hs.  D/c Losartan.    She was switched from Losartan to Inderal - will cover HTN / HA's and Tremors.   She feels headaches much better control since started the Inderal.     Obesity: discussed diet / exercise / life style changes / Ozempic - " lost 5 to 10 lbs in the last few weeks / follow by PCP.   Eyes Clinic: 04/ 2024 - no issues according to Patient.    D/c Topamax ( undesirable SE ) and Elavil ( weight gain ).       Labs: HIV - non reactive.      Personally reviewed CT Sinuses: 10/ 2024 - normal.     MEDICAL/SURGICAL COMORBIDITIES     All relevant medical comorbidities noted and managed by primary care physician and medical care team.      HEALTHY LIFESTYLE AND PREVENTATIVE CARE    The patient to adhere to the age-appropriate health maintenance guidelines including screening tests and vaccinations.   The patient to adhere to  healthy lifestyle, optimal weight, exercise, healthy diet,   good sleep hygiene and avoiding drugs including smoking, alcohol and recreational drugs.    RTC: 2 months.      I spent a total of 20 minutes on the day of the visit.This includes face to face time and non-face to face time preparing to see the patient (eg, review of tests),   obtaining and/or reviewing separately obtained history, documenting clinical information in the electronic or other health record,   independently interpreting results and communicating results to the patient/family/caregiver, or care coordinator.    Please do not hesitate to contact me with any updates, questions or concerns.    Lionel Singh MD.  General Neurologist.          [1]   Current Outpatient Medications:     albuterol 90 mcg/actuation inhaler, Inhale 2 puffs into the lungs every 4 (four) hours as needed., Disp: 1 Inhaler, Rfl: 2    amLODIPine (NORVASC) 10 MG tablet, Take 1 tablet by mouth once daily., Disp: , Rfl:     atorvastatin (LIPITOR) 40 MG tablet, , Disp: , Rfl:     azelastine (ASTELIN) 137 mcg (0.1 %) nasal spray, 1 spray (137 mcg total) by Nasal route 2 (two) times daily., Disp: 90 mL, Rfl: 2    desloratadine (CLARINEX) 5 mg tablet, Take 1 tablet (5 mg total) by mouth once daily., Disp: 90 tablet, Rfl: 3    ergocalciferol (ERGOCALCIFEROL) 50,000 unit Cap, Take 50,000 Units  by mouth every 7 days., Disp: , Rfl:     fluconazole (DIFLUCAN) 150 MG Tab, Take 150 mg by mouth once., Disp: , Rfl:     fluticasone (FLONASE SENSIMIST) 27.5 mcg/actuation nasal spray, 2 sprays by Nasal route once daily., Disp: 9.1 mL, Rfl: 6    fluticasone propionate (FLONASE) 50 mcg/actuation nasal spray, 1 spray (50 mcg total) by Each Nostril route 2 (two) times a day., Disp: 16 g, Rfl: 11    furosemide (LASIX) 40 MG tablet, Take 1 tablet (40 mg total) by mouth 2 (two) times daily., Disp: 60 tablet, Rfl: 3    gabapentin (NEURONTIN) 300 MG capsule, Take 300 mg by mouth 3 (three) times daily., Disp: , Rfl:     hydrocodone-acetaminophen 10-325mg (NORCO)  mg Tab, Take by mouth., Disp: , Rfl:     LINZESS 290 mcg Cap capsule, Take 1 capsule by mouth once daily., Disp: , Rfl:     losartan (COZAAR) 100 MG tablet, Take 1 tablet by mouth once daily., Disp: , Rfl:     montelukast (SINGULAIR) 10 mg tablet, Take 1 tablet by mouth once daily., Disp: , Rfl:     multivitamin with folic acid 400 mcg Tab, Take 1 tablet by mouth once daily., Disp: , Rfl:     ondansetron (ZOFRAN-ODT) 4 MG TbDL, Take 4 mg by mouth once., Disp: , Rfl:     OZEMPIC 2 mg/dose (8 mg/3 mL) PnIj, INJECT 2MG UNDER THE SKIN ONE TIME WEEKLY, Disp: , Rfl:     pantoprazole (PROTONIX) 20 MG tablet, Take 1 tablet (20 mg total) by mouth once daily., Disp: 30 tablet, Rfl: 0    potassium chloride SA (K-DUR,KLOR-CON) 10 MEQ tablet, Take 20 mEq by mouth 2 (two) times daily., Disp: , Rfl:   [2]   Social History  Socioeconomic History    Marital status: Single   Occupational History    Occupation:      Employer: [employer] [employer]   Tobacco Use    Smoking status: Light Smoker     Current packs/day: 0.50     Average packs/day: 0.5 packs/day for 4.0 years (2.0 ttl pk-yrs)     Types: Cigarettes    Tobacco comments:     quit smoking 2011   Substance and Sexual Activity    Alcohol use: Yes     Alcohol/week: 7.0 standard drinks of alcohol     Types: 7  Glasses of wine per week     Comment: 1 wine cooler a nite    Drug use: No    Sexual activity: Never     Social Drivers of Health     Financial Resource Strain: Unknown (11/5/2018)    [facility]    Overall Financial Resource Strain (CARDIA)     Difficulty of Paying Living Expenses: Patient declined   Food Insecurity: Unknown (11/5/2018)    Received from [facility]    Hunger Vital Sign     Worried About Running Out of Food in the Last Year: Patient declined     Ran Out of Food in the Last Year: Patient declined   Transportation Needs: Unknown (11/5/2018)    Received from [facility]    PRAPARE - Transportation     Lack of Transportation (Medical): Patient declined     Lack of Transportation (Non-Medical): Patient declined   Physical Activity: Unknown (11/5/2018)    Received from [facility]    Exercise Vital Sign     Days of Exercise per Week: Patient declined     Minutes of Exercise per Session: Patient declined   Stress: Unknown (11/5/2018)    Received from [facility]    [place] [employer] - Occupational Stress Questionnaire     Feeling of Stress : Patient declined

## 2025-02-24 ENCOUNTER — TELEPHONE (OUTPATIENT)
Dept: NEUROLOGY | Facility: CLINIC | Age: 65
End: 2025-02-24
Payer: MEDICARE

## 2025-02-25 ENCOUNTER — OFFICE VISIT (OUTPATIENT)
Dept: NEUROLOGY | Facility: CLINIC | Age: 65
End: 2025-02-25
Payer: MEDICARE

## 2025-02-25 DIAGNOSIS — G43.719 INTRACTABLE CHRONIC MIGRAINE WITHOUT AURA AND WITHOUT STATUS MIGRAINOSUS: Primary | ICD-10-CM

## 2025-02-25 DIAGNOSIS — G47.33 OSA (OBSTRUCTIVE SLEEP APNEA): ICD-10-CM

## 2025-02-25 PROCEDURE — 1160F RVW MEDS BY RX/DR IN RCRD: CPT | Mod: CPTII,95,, | Performed by: PSYCHIATRY & NEUROLOGY

## 2025-02-25 PROCEDURE — 1159F MED LIST DOCD IN RCRD: CPT | Mod: CPTII,95,, | Performed by: PSYCHIATRY & NEUROLOGY

## 2025-02-25 PROCEDURE — 98005 SYNCH AUDIO-VIDEO EST LOW 20: CPT | Mod: 95,,, | Performed by: PSYCHIATRY & NEUROLOGY

## 2025-02-25 RX ORDER — PROPRANOLOL HYDROCHLORIDE 80 MG/1
80 CAPSULE, EXTENDED RELEASE ORAL DAILY
Qty: 30 CAPSULE | Refills: 3 | Status: SHIPPED | OUTPATIENT
Start: 2025-02-25 | End: 2025-06-25

## 2025-03-09 ENCOUNTER — PATIENT MESSAGE (OUTPATIENT)
Dept: NEUROLOGY | Facility: CLINIC | Age: 65
End: 2025-03-09
Payer: MEDICARE

## 2025-03-11 ENCOUNTER — OFFICE VISIT (OUTPATIENT)
Dept: SLEEP MEDICINE | Facility: CLINIC | Age: 65
End: 2025-03-11
Payer: MEDICARE

## 2025-03-11 VITALS — HEIGHT: 63 IN | BODY MASS INDEX: 36.5 KG/M2 | WEIGHT: 206 LBS

## 2025-03-11 DIAGNOSIS — G47.33 OSA (OBSTRUCTIVE SLEEP APNEA): ICD-10-CM

## 2025-03-11 DIAGNOSIS — R06.83 SNORING: ICD-10-CM

## 2025-03-11 DIAGNOSIS — E66.01 SEVERE OBESITY (BMI 35.0-39.9) WITH COMORBIDITY: Primary | ICD-10-CM

## 2025-03-11 DIAGNOSIS — G47.00 INSOMNIA, UNSPECIFIED TYPE: ICD-10-CM

## 2025-03-11 NOTE — PROGRESS NOTES
Subjective:      Patient ID: Sobia Jacobo is a 65 y.o. female.    Chief Complaint: Sleep Apnea  The patient location is: [place]  The chief complaint leading to consultation is: sleep apnea  Visit type: Virtual visit with synchronous audio and video  Total time spent with patient: 14 min   Each patient to whom he or she provides medical services by telemedicine is:  (1) informed of the relationship between the physician and patient and the respective role of any other health care provider with respect to management of the patient; and (2) notified that he or she may decline to receive medical services by telemedicine and may withdraw from such care at any time.    HPI    Patient presents today for evaluation of sleep apnea.  Patient with snoring. It takes her a while to fall asleep and  wakes up frequently throughout the night.  Patient does not wake up feeling refreshed in the morning.  Patient with daytime hypersomnolence.  Dx with anjel 2006 PSG was on 6-23- 06, with an A + H Index = 100.4 events / hr asleep, severe OSAHS? her CPAP titration revealed that 10 cm CPAP pressure was optimal). She had weight surgery and then was unable to tolerate machine. She needs reeval. Comorbidities include BMI 36  Bedtime: 6:30 PM to bedroom to watch TV. After 10PM news tries to fall asleep. May get on computer to read. Feels like she falls asleep at around 11:30 PM  Wake time: 5-6 AM    STOP - BANG Questionnaire:     1. Snoring : Do you snore loudly ?    Yes    2. Tired : Do you often feel tired, fatigued, or sleepy during daytime?   Yes    3. Observed: Has anyone observed you stop breathing during your sleep?   Yes    4. Blood pressure : Do you have or are you being treated for high blood pressure?   Yes    5. BMI :BMI more than 35 kg/m2?   Yes    6. Age : Age over 50 yr old?   Yes    7. Neck circumference: Neck circumference greater than 40 cm?   No    8. Gender: Gender male?   Yes    High risk of ANJEL: Yes 5 -  "8  Intermediate risk of CHRISSY: Yes 3 - 4  Low risk of CHRISSY: Yes 0 - 2      References:   STOP Questionnaire   A Tool to Screen Patients for Obstructive Sleep Apnea: FOREIGN ChairezC.P.C., ALEXX MoodyB.B.S., Chuy Faustin M.D.,Radha Lockett, Ph.D., HEMA Kuhn.B.S.,_ Ivy Arora.,_ Jer Villafana M.D., FOREIGN ReyesC.P.C.; Anesthesiology 2008; 108:812-21 Copyright © 2008, the American Society of Anesthesiologists, Inc. Radha Antonino [employer], Inc[employer]       [place] Questionnaire (validated CHRISSY screening questionnaire)    Yes -- Snoring/apnea    Yes -- Fatigue    Body mass index is Body mass index is 36.49 kg/m²..  (>25 is overweight, >30 is obese)    Blood Pressure = Hypertension  (PreHTN 120-139/80-89, Stg1 140-159/90-99, Stg2 >160/>100)  [place] = three of three CHRISSY categories are positive (high risk is 2-3 positive categories)         3/11/2025    11:02 AM   EPWORTH SLEEPINESS SCALE   Sitting and reading 3   Watching TV 3   Sitting, inactive in a public place (e.g. a theatre or a meeting) 1   As a passenger in a car for an hour without a break 3   Lying down to rest in the afternoon when circumstances permit 1   Sitting and talking to someone 0   Sitting quietly after a lunch without alcohol 0   In a car, while stopped for a few minutes in traffic 0   Total score 11      (validated sleepiness questionnaire with a higher score indicating greater sleepiness; range 0-24)    Problem List[1]      Ht 5' 3" (1.6 m)   Wt 93.4 kg (206 lb)   BMI 36.49 kg/m²   Body mass index is 36.49 kg/m².    Review of Systems   Constitutional:  Positive for fatigue.   Respiratory:  Positive for snoring and somnolence.    Psychiatric/Behavioral:  Positive for sleep disturbance.    All other systems reviewed and are negative.        Objective:      Physical Exam  Constitutional:       Appearance: She is well-developed. She is obese.   HENT:      Head: Normocephalic and atraumatic.   Pulmonary: "      Effort: Pulmonary effort is normal. No tachypnea, bradypnea, accessory muscle usage or respiratory distress.   Musculoskeletal:      Cervical back: Normal range of motion.   Skin:     Findings: No rash.   Neurological:      Mental Status: She is alert and oriented to person, place, and time.   Psychiatric:         Behavior: Behavior normal.         Thought Content: Thought content normal.         Judgment: Judgment normal.       Personal Diagnostic Review      Assessment:     1. Severe obesity (BMI 35.0-39.9) with comorbidity    2. CHRISSY (obstructive sleep apnea)    3. Snoring    4. Insomnia, unspecified type       Encounter Medications[2]  Orders Placed This Encounter   Procedures    Home Sleep Study     Standing Status:   Future     Expiration Date:   3/11/2026     Scheduling Instructions:      2 night protocol     Plan:   Sleep study to evaluate for sleep apnea. She is requesting at home sleep study.     1. Severe obesity (BMI 35.0-39.9) with comorbidity  Comments:  weight loss surgery    2. CHRISSY (obstructive sleep apnea)  -     Ambulatory referral/consult to Sleep Disorders  -     Home Sleep Study; Future    3. Snoring  Comments:  sleep study    4. Insomnia, unspecified type  Comments:  discussed sleep hygiene              [1]   Patient Active Problem List  Diagnosis    Fatty liver    HTN (hypertension)    Prediabetes    CHRISSY on CPAP    Hx of colonic polyp    Recurrent sinus infections    Drug reaction    Seasonal allergic rhinitis due to pollen    Allergic rhinitis due to dust mite    Morbid (severe) obesity due to excess calories   [2]   Outpatient Encounter Medications as of 3/11/2025   Medication Sig Dispense Refill    amLODIPine (NORVASC) 10 MG tablet Take 1 tablet by mouth once daily.      atorvastatin (LIPITOR) 40 MG tablet       azelastine (ASTELIN) 137 mcg (0.1 %) nasal spray 1 spray (137 mcg total) by Nasal route 2 (two) times daily. 90 mL 2    desloratadine (CLARINEX) 5 mg tablet Take 1 tablet (5 mg  total) by mouth once daily. 90 tablet 3    ergocalciferol (ERGOCALCIFEROL) 50,000 unit Cap Take 50,000 Units by mouth every 7 days.      fluconazole (DIFLUCAN) 150 MG Tab Take 150 mg by mouth once.      fluticasone (FLONASE SENSIMIST) 27.5 mcg/actuation nasal spray 2 sprays by Nasal route once daily. 9.1 mL 6    fluticasone propionate (FLONASE) 50 mcg/actuation nasal spray 1 spray (50 mcg total) by Each Nostril route 2 (two) times a day. 16 g 11    furosemide (LASIX) 40 MG tablet Take 1 tablet (40 mg total) by mouth 2 (two) times daily. 60 tablet 3    gabapentin (NEURONTIN) 300 MG capsule Take 300 mg by mouth 3 (three) times daily.      hydrocodone-acetaminophen 10-325mg (NORCO)  mg Tab Take by mouth.      LINZESS 290 mcg Cap capsule Take 1 capsule by mouth once daily.      montelukast (SINGULAIR) 10 mg tablet Take 1 tablet by mouth once daily.      multivitamin with folic acid 400 mcg Tab Take 1 tablet by mouth once daily.      ondansetron (ZOFRAN-ODT) 4 MG TbDL Take 4 mg by mouth once.      OZEMPIC 2 mg/dose (8 mg/3 mL) PnIj INJECT 2MG UNDER THE SKIN ONE TIME WEEKLY      pantoprazole (PROTONIX) 20 MG tablet Take 1 tablet (20 mg total) by mouth once daily. 30 tablet 0    potassium chloride SA (K-DUR,KLOR-CON) 10 MEQ tablet Take 20 mEq by mouth 2 (two) times daily.      propranoloL (INDERAL LA) 80 MG 24 hr capsule Take 1 capsule (80 mg total) by mouth once daily. 30 capsule 3    albuterol 90 mcg/actuation inhaler Inhale 2 puffs into the lungs every 4 (four) hours as needed. 1 Inhaler 2     No facility-administered encounter medications on file as of 3/11/2025.

## 2025-03-12 ENCOUNTER — TELEPHONE (OUTPATIENT)
Dept: SLEEP MEDICINE | Facility: CLINIC | Age: 65
End: 2025-03-12
Payer: MEDICARE

## 2025-03-12 NOTE — TELEPHONE ENCOUNTER
----- Message from Isra Olson sent at 3/11/2025  1:41 PM CDT -----  [facility] reviewed for HS[facility] test   
no concerns

## 2025-03-15 PROCEDURE — 95806 SLEEP STUDY UNATT&RESP EFFT: CPT | Performed by: INTERNAL MEDICINE

## 2025-03-16 PROCEDURE — 95806 SLEEP STUDY UNATT&RESP EFFT: CPT

## 2025-03-24 ENCOUNTER — HOSPITAL ENCOUNTER (OUTPATIENT)
Dept: SLEEP MEDICINE | Facility: HOSPITAL | Age: 65
Discharge: HOME | End: 2025-03-24
Attending: NURSE PRACTITIONER
Payer: MEDICARE

## 2025-03-24 DIAGNOSIS — G47.33 OSA (OBSTRUCTIVE SLEEP APNEA): ICD-10-CM

## 2025-03-24 PROCEDURE — 95806 SLEEP STUDY UNATT&RESP EFFT: CPT | Performed by: INTERNAL MEDICINE

## 2025-03-24 NOTE — PROCEDURES
PHYSICIAN INTERPRETATION AND COMMENTS: Findings are consistent with very mild, positional obstructive sleep  apnea(CHRISSY).  CLINICAL HISTORY: 65 year old female BMI 36 stop Bang 6. Patient with daytime hypersomnolence. Dx with CHRISSY 2006 PSG  was on 6-23- 06, with an A + H Index = 100.4 events / hr asleep, severe OSAHS; status post weight loss surgery.  SLEEP STUDY FINDINGS: Patient underwent a 2 night Home Sleep Test and by behavioral criteria, slept for approximately  11.75 hours, with a sleep latency of 12 minutes and a sleep efficiency of 86.4%. The patient slept supine 64.8% of the night  based on valid recording time of 12.3 hours. Snoring occurs for 0.7% (30 dB) of the study. The mean pulse rate is 79.2 BPM,  with infrequent pulse rate variability (24 events with >= 6 BPM increase/decrease per hour).  TREATMENT CONSIDERATIONS: Due to high pretest risk consider in-lab polysomnography. Alternatively, a trial of CPAP  therapy can be initiated.  DISEASE MANAGEMENT CONSIDERATIONS: The patient will be seen for a post-evaluation Sleep Medicine follow up to  discuss the above results and treatment recommendations.

## 2025-03-25 ENCOUNTER — TELEPHONE (OUTPATIENT)
Dept: SLEEP MEDICINE | Facility: CLINIC | Age: 65
End: 2025-03-25
Payer: MEDICARE

## 2025-03-27 ENCOUNTER — TELEPHONE (OUTPATIENT)
Dept: ALLERGY | Facility: CLINIC | Age: 65
End: 2025-03-27
Payer: MEDICARE

## 2025-03-27 ENCOUNTER — OFFICE VISIT (OUTPATIENT)
Dept: ALLERGY | Facility: CLINIC | Age: 65
End: 2025-03-27
Payer: MEDICARE

## 2025-03-27 DIAGNOSIS — J32.9 RECURRENT SINUS INFECTIONS: ICD-10-CM

## 2025-03-27 DIAGNOSIS — T50.905D ADVERSE EFFECT OF DRUG, SUBSEQUENT ENCOUNTER: ICD-10-CM

## 2025-03-27 DIAGNOSIS — J30.89 ALLERGIC RHINITIS DUE TO DUST MITE: ICD-10-CM

## 2025-03-27 DIAGNOSIS — J30.1 SEASONAL ALLERGIC RHINITIS DUE TO POLLEN: Primary | ICD-10-CM

## 2025-03-27 RX ORDER — MONTELUKAST SODIUM 10 MG/1
10 TABLET ORAL DAILY
Qty: 90 TABLET | Refills: 3 | Status: SHIPPED | OUTPATIENT
Start: 2025-03-27 | End: 2026-03-27

## 2025-03-27 RX ORDER — PREDNISONE 20 MG/1
TABLET ORAL
Qty: 13 TABLET | Refills: 0 | Status: SHIPPED | OUTPATIENT
Start: 2025-03-27 | End: 2025-04-04

## 2025-03-27 RX ORDER — DESLORATADINE 5 MG/1
5 TABLET ORAL DAILY
Qty: 90 TABLET | Refills: 3 | Status: SHIPPED | OUTPATIENT
Start: 2025-03-27 | End: 2026-03-27

## 2025-03-27 RX ORDER — AMOXICILLIN AND CLAVULANATE POTASSIUM 875; 125 MG/1; MG/1
1 TABLET, FILM COATED ORAL EVERY 12 HOURS
Qty: 14 TABLET | Refills: 0 | Status: SHIPPED | OUTPATIENT
Start: 2025-03-27 | End: 2025-04-03

## 2025-03-27 NOTE — PROGRESS NOTES
The patient location is: [facility]   The chief complaint leading to consultation is: Sinus Infection     Visit type: audiovisual    Face to Face time with patient: 10  30 minutes of total time spent on the encounter, which includes face to face time and non-face to face time preparing to see the patient (eg, review of tests), Obtaining and/or reviewing separately obtained history, Documenting clinical information in the electronic or other health record, Independently interpreting results (not separately reported) and communicating results to the patient/family/caregiver, or Care coordination (not separately reported).     Each patient to whom he or she provides medical services by telemedicine is:  (1) informed of the relationship between the physician and patient and the respective role of any other health care provider with respect to management of the patient; and (2) notified that he or she may decline to receive medical services by telemedicine and may withdraw from such care at any time.    Allergy and Immunology  Established Patient Clinic Note    Date: 3/27/2025  Chief Complaint   Patient presents with    Sinus Problem     History  Sobia Jacobo is a 65 y.o. female being seen for follow-up today.    Allergic Rhinitis due to dust mites and tree/grass pollen   - Not tolerating Astelin   - Switching to Ryaltris 2 SEN BID monotherapy      Drug Allergy  - Mobic: Chest tightness, no urticaria   - ASA: tachycardia      Recurrent Infections  - Acute sinus infection at this time   - Concern for otogenic etiology - left sided      Initial HPI:   - Patient with recurrent infections   - Patient reported 3-4 infections per years  - No hx of otitis media or PNA in adulthood   - No hx of sepsis or recurrent viral/fungal infection  Allergies, PMH, PSH, Social, and Family History were reviewed.    Medications Ordered Prior to Encounter[1]    Physical Examination  There were no vitals filed for this visit.  GENERAL:   female in no apparent distress and well developed and well nourished  HEAD:  Normocephalic, without obvious abnormality, atraumatic  EYES: sclera anicteric, conjunctiva normochromic  LUNGS: no tachypnea, retractions or cyanosis.  HEART: not examined.  ABDOMEN: not examined.  MUSCULOSKELETAL: no gross joint deformity or swelling.  NEURO: alert, oriented, normal speech, no focal findings or movement disorder noted.  SKIN: normal coloration and turgor, no rashes, no suspicious skin lesions noted.     Assessment/Plan:   Problem List Items Addressed This Visit       Recurrent sinus infections    Overview   - Patient with recurrent infections   - Patient reported 3-4 infections per years  - No hx of otitis media or PNA in adulthood   - No hx of sepsis or recurrent viral/fungal infection         Drug reaction    Overview   - Mobic: Chest tightness, no urticaria   - ASA: tachycardia   - Tolerates Naproxen and Ibuprofen          Seasonal allergic rhinitis due to pollen - Primary    Overview   - 09/23/2024: Serum IgE for Zone 6 Aeroallergens positive to dust mites and tree/grass pollen          Allergic rhinitis due to dust mite    Overview   - 09/23/2024: Serum IgE for Zone 6 Aeroallergens positive to dust mites and tree/grass pollen           - Acute sinusitis - Abx and steroids   - Chronic AR - switching to Ryaltris 2 SEN BID  - ED/RTC precautions discussed     Follow up:  Follow up in about 3 months (around 6/27/2025).    [facility]  Allergy and Immunology        [1]   Current Outpatient Medications on File Prior to Visit   Medication Sig Dispense Refill    albuterol 90 mcg/actuation inhaler Inhale 2 puffs into the lungs every 4 (four) hours as needed. 1 Inhaler 2    amLODIPine (NORVASC) 10 MG tablet Take 1 tablet by mouth once daily.      atorvastatin (LIPITOR) 40 MG tablet       ergocalciferol (ERGOCALCIFEROL) 50,000 unit Cap Take 50,000 Units by mouth every 7 days.      fluconazole  (DIFLUCAN) 150 MG Tab Take 150 mg by mouth once.      fluticasone (FLONASE SENSIMIST) 27.5 mcg/actuation nasal spray 2 sprays by Nasal route once daily. 9.1 mL 6    furosemide (LASIX) 40 MG tablet Take 1 tablet (40 mg total) by mouth 2 (two) times daily. 60 tablet 3    gabapentin (NEURONTIN) 300 MG capsule Take 300 mg by mouth 3 (three) times daily.      hydrocodone-acetaminophen 10-325mg (NORCO)  mg Tab Take by mouth.      LINZESS 290 mcg Cap capsule Take 1 capsule by mouth once daily.      montelukast (SINGULAIR) 10 mg tablet Take 1 tablet by mouth once daily.      multivitamin with folic acid 400 mcg Tab Take 1 tablet by mouth once daily.      ondansetron (ZOFRAN-ODT) 4 MG TbDL Take 4 mg by mouth once.      OZEMPIC 2 mg/dose (8 mg/3 mL) PnIj INJECT 2MG UNDER THE SKIN ONE TIME WEEKLY      pantoprazole (PROTONIX) 20 MG tablet Take 1 tablet (20 mg total) by mouth once daily. 30 tablet 0    potassium chloride SA (K-DUR,KLOR-CON) 10 MEQ tablet Take 20 mEq by mouth 2 (two) times daily.      propranoloL (INDERAL LA) 80 MG 24 hr capsule Take 1 capsule (80 mg total) by mouth once daily. 30 capsule 3    [DISCONTINUED] azelastine (ASTELIN) 137 mcg (0.1 %) nasal spray 1 spray (137 mcg total) by Nasal route 2 (two) times daily. 90 mL 2    [DISCONTINUED] desloratadine (CLARINEX) 5 mg tablet Take 1 tablet (5 mg total) by mouth once daily. 90 tablet 3    [DISCONTINUED] fluticasone propionate (FLONASE) 50 mcg/actuation nasal spray 1 spray (50 mcg total) by Each Nostril route 2 (two) times a day. 16 g 11     No current facility-administered medications on file prior to visit.

## 2025-03-27 NOTE — TELEPHONE ENCOUNTER
----- Message from Nurse Daniel sent at 3/27/2025 11:55 AM CDT -----  Contact: Sobia    ----- Message -----  From: Jayla Simmons  Sent: 3/27/2025  11:12 AM CDT  To: Juan Lorenz Staff    Sobia is calling to speak to the nurse regarding if a sooner virtual appointment is available, please give her a call at , [facility] of [facility]

## 2025-03-28 ENCOUNTER — TELEPHONE (OUTPATIENT)
Dept: ALLERGY | Facility: CLINIC | Age: 65
End: 2025-03-28
Payer: MEDICARE

## 2025-03-28 NOTE — TELEPHONE ENCOUNTER
I left a voice mail for patient to call her insurance and get a name of medication they do cover.      ----- Message from Summer sent at 3/28/2025 12:10 PM CDT -----  Contact: Sobia  Type:  Patient Call Back Request Who Called: Sobia Message for Patient: Nurse What this is regarding?: olopatadine-mometasone 665-25 mcg/spray Spry Would the patient rather a call back or a response via [facility]? Call back Best Call Back Number:  [phone] Information:  Her insurance did not cover it. Cost 300.

## 2025-03-31 ENCOUNTER — TELEPHONE (OUTPATIENT)
Dept: ALLERGY | Facility: CLINIC | Age: 65
End: 2025-03-31
Payer: MEDICARE

## 2025-03-31 NOTE — TELEPHONE ENCOUNTER
3/31/25 - Spoke with patient regarding medication. Informed patient provider will be in on tomorrow morning to sign authorization, and once signed, it will be faxed. Advised patient to continue Flonase until prescription has been filled at preferred pharmacy. - IB    ----- Message from Vanesa sent at 3/31/2025  1:36 PM CDT -----  Contact: Sobia  Type:  Needs Medical AdviceWho Called: SobiaWould the patient rather a call back or a response via [facility]? Call [facility] Call Back Number:  [phone] Information: Sobia is calling to find out if her reason for needing to take the nasal spray has been sent to her insurance, and also a pre authorization. Please let her know if she need to stay on her medication until everything is cleared up. She is unable to take the azelastine h[facility] because it gives her the jitters, and the flonase just doesn't work anymore.

## 2025-04-10 ENCOUNTER — OFFICE VISIT (OUTPATIENT)
Dept: PULMONOLOGY | Facility: CLINIC | Age: 65
End: 2025-04-10
Payer: MEDICARE

## 2025-04-10 VITALS — BODY MASS INDEX: 36.32 KG/M2 | HEIGHT: 63 IN | WEIGHT: 205 LBS

## 2025-04-10 DIAGNOSIS — G47.33 OSA (OBSTRUCTIVE SLEEP APNEA): ICD-10-CM

## 2025-04-10 DIAGNOSIS — G47.00 INSOMNIA, UNSPECIFIED TYPE: Primary | ICD-10-CM

## 2025-04-10 DIAGNOSIS — Z72.821 POOR SLEEP HYGIENE: ICD-10-CM

## 2025-04-10 DIAGNOSIS — F43.21 GRIEF: ICD-10-CM

## 2025-04-10 NOTE — PROGRESS NOTES
"Subjective:      Patient ID: Sobia Jacobo is a 65 y.o. female.    Chief Complaint: No chief complaint on file.  The patient location is: [place]  The chief complaint leading to consultation is: sleep  Visit type: Virtual visit with synchronous audio and video  Total time spent with patient: 24 min   Each patient to whom he or she provides medical services by telemedicine is:  (1) informed of the relationship between the physician and patient and the respective role of any other health care provider with respect to management of the patient; and (2) notified that he or she may decline to receive medical services by telemedicine and may withdraw from such care at any time.    HPI    Presents for evaluation of sleep. She had weight loss of 60 lb since her sleep study in 2006.   She has insomnia. Loss of multiple family members. Feeling grief and depression. She would like a referral for therapy.   Not working. May wake up and go back to sleep in the morning.   Watches TV in Bed.   Her sister says she snores only when "real tired" and very mild.       Problem List[1]  Ht 5' 3" (1.6 m)   Wt 93 kg (205 lb)   BMI 36.31 kg/m²   Body mass index is 36.31 kg/m².    Review of Systems   Constitutional:  Positive for fatigue.   HENT: Negative.     Respiratory: Negative.     Cardiovascular: Negative.    Musculoskeletal: Negative.    Gastrointestinal: Negative.    Neurological: Negative.    Psychiatric/Behavioral:  Positive for sleep disturbance.      Objective:      Physical Exam  Constitutional:       Appearance: She is well-developed.   HENT:      Head: Normocephalic and atraumatic.   Pulmonary:      Effort: Pulmonary effort is normal. No tachypnea, bradypnea, accessory muscle usage or respiratory distress.   Musculoskeletal:      Cervical back: Normal range of motion.   Skin:     Findings: No rash.   Neurological:      Mental Status: She is alert and oriented to person, place, and time.   Psychiatric:         Behavior: " Behavior normal.         Thought Content: Thought content normal.         Judgment: Judgment normal.       Personal Diagnostic Review      3/11/2025    11:02 AM   EPWORTH SLEEPINESS SCALE   Sitting and reading 3   Watching TV 3   Sitting, inactive in a public place (e.g. a theatre or a meeting) 1   As a passenger in a car for an hour without a break 3   Lying down to rest in the afternoon when circumstances permit 1   Sitting and talking to someone 0   Sitting quietly after a lunch without alcohol 0   In a car, while stopped for a few minutes in traffic 0   Total score 11        Procedure Note      PHYSICIAN INTERPRETATION AND COMMENTS: Findings are consistent with very mild, positional obstructive sleep  apnea(CHRISSY).  CLINICAL HISTORY: 65 year old female BMI 36 stop Bang 6. Patient with daytime hypersomnolence. Dx with CHRISSY 2006 PSG  was on 6-23- 06, with an A + H Index = 100.4 events / hr asleep, severe OSAHS; status post weight loss surgery.  SLEEP STUDY FINDINGS: Patient underwent a 2 night Home Sleep Test and by behavioral criteria, slept for approximately  11.75 hours, with a sleep latency of 12 minutes and a sleep efficiency of 86.4%. The patient slept supine 64.8% of the night  based on valid recording time of 12.3 hours. Snoring occurs for 0.7% (30 dB) of the study. The mean pulse rate is 79.2 BPM,  with infrequent pulse rate variability (24 events with >= 6 BPM increase/decrease per hour).  TREATMENT CONSIDERATIONS: Due to high pretest risk consider in-lab polysomnography. Alternatively, a trial of CPAP  therapy can be initiated.  DISEASE MANAGEMENT CONSIDERATIONS: The patient will be seen for a post-evaluation Sleep Medicine follow up to  discuss the above results and treatment recommendations.            Assessment:       1. Insomnia, unspecified type    2. CHRISSY (obstructive sleep apnea)    3. Grief    4. Poor sleep hygiene        Encounter Medications[2]  Orders Placed This Encounter   Procedures     Ambulatory referral/consult to Psychology     Standing Status:   Future     Expected Date:   2025     Expiration Date:   5/10/2026     Referral Priority:   Routine     Referral Type:   Psychiatric     Referral Reason:   Specialty Services Required     Requested Specialty:   Psychology     Number of Visits Requested:   1     Plan:       1. Insomnia, unspecified type  -     Ambulatory referral/consult to Psychology; Future; Expected date: 2025    2. CHRISSY (obstructive sleep apnea)  Comments:  Resolved with weight loss    3. Grief    4. Poor sleep hygiene    Poor sleep hygiene and insomnia due to grief and depression. Referral for therapy.   Information on improving sleep hygiene given to patient.   Follow up as needed.          I spent a total of 30 minutes on the day of the visit.  This includes face to face time and non-face to face time preparing to see the patient (eg, review of tests), obtaining and/or reviewing separately obtained history, documenting clinical information in the electronic or other health record, independently interpreting results and communicating results to the patient/family/caregiver, or care coordinator.           Elizabeth LeJeune, ACNP, ANP         [1]   Patient Active Problem List  Diagnosis    Fatty liver    HTN (hypertension)    Prediabetes    CHRISSY (obstructive sleep apnea)    Hx of colonic polyp    Recurrent sinus infections    Drug reaction    Seasonal allergic rhinitis due to pollen    Allergic rhinitis due to dust mite    Morbid (severe) obesity due to excess calories   [2]   Outpatient Encounter Medications as of 4/10/2025   Medication Sig Dispense Refill    albuterol 90 mcg/actuation inhaler Inhale 2 puffs into the lungs every 4 (four) hours as needed. 1 Inhaler 2    amLODIPine (NORVASC) 10 MG tablet Take 1 tablet by mouth once daily.      [] amoxicillin-clavulanate 875-125mg (AUGMENTIN) 875-125 mg per tablet Take 1 tablet by mouth every 12 (twelve) hours. for 7  days 14 tablet 0    atorvastatin (LIPITOR) 40 MG tablet       desloratadine (CLARINEX) 5 mg tablet Take 1 tablet (5 mg total) by mouth once daily. 90 tablet 3    ergocalciferol (ERGOCALCIFEROL) 50,000 unit Cap Take 50,000 Units by mouth every 7 days.      fluconazole (DIFLUCAN) 150 MG Tab Take 150 mg by mouth once.      fluticasone (FLONASE SENSIMIST) 27.5 mcg/actuation nasal spray 2 sprays by Nasal route once daily. 9.1 mL 6    furosemide (LASIX) 40 MG tablet Take 1 tablet (40 mg total) by mouth 2 (two) times daily. 60 tablet 3    gabapentin (NEURONTIN) 300 MG capsule Take 300 mg by mouth 3 (three) times daily.      hydrocodone-acetaminophen 10-325mg (NORCO)  mg Tab Take by mouth.      LINZESS 290 mcg Cap capsule Take 1 capsule by mouth once daily.      montelukast (SINGULAIR) 10 mg tablet Take 1 tablet by mouth once daily.      montelukast (SINGULAIR) 10 mg tablet Take 1 tablet (10 mg total) by mouth once daily. 90 tablet 3    multivitamin with folic acid 400 mcg Tab Take 1 tablet by mouth once daily.      olopatadine-mometasone 665-25 mcg/spray Spry 2 sprays by Nasal route 2 (two) times daily. 29 g 11    ondansetron (ZOFRAN-ODT) 4 MG TbDL Take 4 mg by mouth once.      OZEMPIC 2 mg/dose (8 mg/3 mL) PnIj INJECT 2MG UNDER THE SKIN ONE TIME WEEKLY      pantoprazole (PROTONIX) 20 MG tablet Take 1 tablet (20 mg total) by mouth once daily. 30 tablet 0    potassium chloride SA (K-DUR,KLOR-CON) 10 MEQ tablet Take 20 mEq by mouth 2 (two) times daily.      [] predniSONE (DELTASONE) 20 MG tablet Take 2 tablets (40 mg total) by mouth once daily for 5 days, THEN 1 tablet (20 mg total) once daily for 3 days. 13 tablet 0    propranoloL (INDERAL LA) 80 MG 24 hr capsule Take 1 capsule (80 mg total) by mouth once daily. 30 capsule 3    [DISCONTINUED] azelastine (ASTELIN) 137 mcg (0.1 %) nasal spray 1 spray (137 mcg total) by Nasal route 2 (two) times daily. 90 mL 2    [DISCONTINUED] desloratadine (CLARINEX) 5 mg tablet  Take 1 tablet (5 mg total) by mouth once daily. 90 tablet 3    [DISCONTINUED] fluticasone propionate (FLONASE) 50 mcg/actuation nasal spray 1 spray (50 mcg total) by Each Nostril route 2 (two) times a day. 16 g 11     No facility-administered encounter medications on file as of 4/10/2025.

## 2025-04-10 NOTE — PATIENT INSTRUCTIONS
"Digital Cognitive Behavioral Therapy for Insomnia (CBT-I)    The cognitive part of CBT-I teaches you to recognize and change beliefs that affect your ability to sleep. This type of therapy can help you control or eliminate negative thoughts and worries that keep you awake.    The behavioral part of CBT-I helps you develop good sleep habits and avoid behaviors that keep you from sleeping well.    Insomnia symptoms are very common due to medical and mental health conditions including cancer, pain, depression/anxiety/bipolar disorder, endocrine or hormonal changes. It is important to address these health problems while working on CBT-I.     As you are aware, there  is no "magic bullet" or "magic pill" for poor sleep. Sleeping pills affect your sleep quality and depth of sleep. You may also have issues with rebound insomnia when trying to discontinue taking sleeping medications.     CBT-I is not a passive solution. The programs require sustained and significant effort in order for improvement to be achieved but the rewards are well worth it!    I would recommend starting off with the Calm bernardo or the Aura bernardo. If more interaction is needed, here are some recommendations:    www.freecbti.com   Free digital cognitive behavioral therapy for insomnia    www.SupplySeeker.com.[employer]   This bernardo has a dedicated sleep  (an actual person via text) who is a trained behavioral health and wellness professional. $300 for 8 week program. 7 day free trial.    www.[employer]   With this bernardo you log in once a week with your virtual sleep expert (not actual person) and work on your plan. $50. May be free with insurance      https://Posmetrics.Bee There/products/go-to-sleep-online              Go! To Sleep-[facility]. $40    https://www.veterantraining.va.gov/insomnia/   Free with VA benefits.       Best way to obtain adequate sleep:  If you have sleep apnea, use prescribed CPAP any time you are sleeping.  Avoid caffeine " "in the afternoon/evening time.  Avoid alcohol. Alcohol is a sedative that blocks your REM sleep    Stimulus control -- Stimulus control therapy is based on the idea that some people with insomnia have learned to associate the bedroom with staying awake rather than sleeping.  ?You should spend no more than 20 minutes lying in bed trying to fall asleep.  ?If you cannot fall asleep within 20 minutes, get up, go to another room and read or find another relaxing activity until you feel sleepy again. Activities such as eating, balancing your checkbook, doing housework, watching TV, or studying for a test, which "reward" you for staying awake, should be avoided.  ?When you start to feel sleepy, you can return to bed. If you cannot fall asleep in another 20 minutes, repeat the process.  ?Set an alarm clock and get up at the same time every day, including weekends.  ?Do not take a nap during the day.  You may not sleep much on the first night. However, sleep is more likely on succeeding nights because sleepiness is increased and naps are not allowed.    Restrict time in bedroom to 8 hours. Some people with insomnia have long awakenings during the night and some try to deal with their poor sleep by staying in bed longer in the morning to "make up" some of their lost sleep. This additional sleep later in the morning may make it more difficult to fall asleep that night, resulting in the need to stay in bed even longer the following morning. This restriction should consolidate sleep and breaks this cycle.  Do not attempt to go to bed until you are awake for 16 hours.   Do not go to bed if you are not sleepy.  Do not go to bedroom until it is time to go to sleep.    One main reason for insomnia today is electronics. No TV or electronics in the bedroom. Associate the bedroom to only sleep and sex.   All electronic use outside the bedroom. Stop using electronics 1-2 hours before bedtime. The electronics have blue lights which have " "a profound suppressive effect on your natural melatonin which assist with sleep. If your phone or pad has a "night shift" option, change to that 2 hours before bedtime. This makes the light on the phone a yellow softer light. Check your settings-display and brightness-night shift. When we use electronics, we also tend to delay sleep time by getting involved in a game or social media. Set a timer when it is ready to go to bed and stick with it.   Dim lights an hour before bedtime.  Meditation and warm bath helps with preparing for sleep.  Make bedroom cool and dark. White noise helps some people sleep more soundly.  Sleep should be nonnegotiable . Give yourself at least 8 hours of uninterrupted sleep nightly for your health and well-being.                        The vast field of sleep medicine is always evolving. Listen to Talking Sleep, a podcast of the American Academy of Sleep Medicine (AASM), to keep up on the latest developments in clinical sleep medicine and sleep disorders. Our host, Dr. Rosa Pacheco, medical director of the [facility] in [place], will take an in-depth look at issues impacting the diagnosis and treatment of sleep disorders. Episodes will feature conversations with clinicians, researchers, sleep team members and other health care experts working to help us sleep well so we can live well.             The Nehemias Carvajal Podcast is all about sleep, the brain, and the body. Nehemias is a Professor of Neuroscience at the [employer]. He is the author of the book, Why We Sleep and has given a few SHANTAL talks.    "

## 2025-04-11 ENCOUNTER — PATIENT MESSAGE (OUTPATIENT)
Dept: PSYCHIATRY | Facility: CLINIC | Age: 65
End: 2025-04-11
Payer: MEDICARE

## 2025-06-25 NOTE — PROGRESS NOTES
Subjective:       Patient ID: Sobia Jacobo is a 65 y.o. female.    Chief Complaint: No chief complaint on file.    HPI The patient presented on 02/ 2025 for evaluation of Migraines Headaches.  New issues: none.   Headaches: improved Migraines / still daily TT HA's.   Meds SE: none.     The originating site (patient location) is: Home.    The distant site (neurologist location) is: Neurology Clinic at [facility].    The chief complaint leading to consultation is: Headaches.     Visit type: Virtual visit with synchronous audio and video.    Consent: The patient verbally consented to participating in the video visit and informed that may   decline to receive medical services by telemedicine and may withdraw from such care at any time.    I discussed with the patient the nature of our telemedicine visits, that:    I  would evaluate the patient and recommend diagnostics and treatments based on my assessment.    Our sessions are not being recorded and that personal health information is protected.    Our team would provide follow up care in person if/when the patient needs it.    Virtual (video/telemedicine) visits have significant limitations.   A telemedicine exam is primarily focused on the history and what I can observe.   Several critical parts of the neurological exam cannot be performed.     Review of Systems      Daily headaches.    Current Medications[1]    Past Medical History:   Diagnosis Date    Fatty liver     GERD (gastroesophageal reflux disease)     Hypertension     Prediabetes     Seasonal allergies     Sleep apnea      Past Surgical History:   Procedure Laterality Date    breast reduction      HYSTERECTOMY      ROTATOR CUFF REPAIR       Social History[2]    Past/Current Medical/Surgical History, Past/Current Social History,   Past/Current Family History and Past/Current Medications were reviewed in detail.    Objective:     GENERAL APPEARANCE:     The patient looks comfortable.    No signs of  respiratory distress.    Normal breathing pattern.    No dysmorphic features    Normal eye contact.     GENERAL MEDICAL EXAM:    HEENT:  Head is atraumatic normocephalic.      Neck and Axillae: No JVD. No visible lesions.    Cardiopulmonary: No cyanosis. No tachypnea. Normal respiratory effort.    Gastrointestinal/Urogenital:  No jaundice. No stomas or lesions. No visible hernias. No catheters.     Skin, Hair and Nails: No pathognonomic skin rash. No neurofibromatosis.   No visible lesions.No stigmata of autoimmune disease. No clubbing.    Limbs: No varicose veins. No visible swelling.    Muskoskeletal: No visible deformities.No visible lesions.    Neurological Exam    Grossly unchanged Neuro.    Lab Results   Component Value Date    WBC 5.74 09/19/2024    HGB 11.9 (L) 09/19/2024    HCT 38.6 09/19/2024    MCV 87 09/19/2024     09/19/2024     Sodium   Date Value Ref Range Status   04/23/2018 141 136 - 145 mmol/L Final     Potassium   Date Value Ref Range Status   04/23/2018 4.2 3.5 - 5.1 mmol/L Final     Chloride   Date Value Ref Range Status   04/23/2018 104 95 - 110 mmol/L Final     CO2   Date Value Ref Range Status   04/23/2018 26 23 - 29 mmol/L Final     Glucose   Date Value Ref Range Status   04/23/2018 79 70 - 110 mg/dL Final     BUN   Date Value Ref Range Status   04/23/2018 13 6 - 20 mg/dL Final     Creatinine   Date Value Ref Range Status   04/23/2018 0.8 0.5 - 1.4 mg/dL Final     Calcium   Date Value Ref Range Status   04/23/2018 9.6 8.7 - 10.5 mg/dL Final     Total Protein   Date Value Ref Range Status   04/23/2018 8.3 6.0 - 8.4 g/dL Final     Albumin   Date Value Ref Range Status   04/23/2018 3.3 (L) 3.5 - 5.2 g/dL Final     Total Bilirubin   Date Value Ref Range Status   04/23/2018 0.7 0.1 - 1.0 mg/dL Final     Comment:     For infants and newborns, interpretation of results should be based  on gestational age, weight and in agreement with clinical  observations.  Premature Infant recommended  reference ranges:  Up to 24 hours.............<8.0 mg/dL  Up to 48 hours............<12.0 mg/dL  3-5 days..................<15.0 mg/dL  6-29 days.................<15.0 mg/dL       Alkaline Phosphatase   Date Value Ref Range Status   04/23/2018 113 55 - 135 U/L Final     AST   Date Value Ref Range Status   04/23/2018 63 (H) 10 - 40 U/L Final     ALT   Date Value Ref Range Status   04/23/2018 46 (H) 10 - 44 U/L Final     Anion Gap   Date Value Ref Range Status   04/23/2018 11 8 - 16 mmol/L Final     eGFR if    Date Value Ref Range Status   04/23/2018 >60.0 >60 mL/min/1.73 m^2 Final     eGFR if non    Date Value Ref Range Status   04/23/2018 >60.0 >60 mL/min/1.73 m^2 Final     Comment:     Calculation used to obtain the estimated glomerular filtration  rate (eGFR) is the CKD-EPI equation.        Lab Results   Component Value Date    WSJOWLJC73 942 (H) 06/11/2008     Lab Results   Component Value Date    TSH 0.733 02/27/2015    B2ZEZYB 114 02/26/2010    S9FXERI 8.5 02/26/2010     No results found in the last 24 hours.    LABORATORY EVALUATION    RADIOLOGY EVALUATION     NEUROPHYSIOLOGY EVALUATION     PATHOLOGY EVALUATION      NEUROCOGNITIVE AND NEUROPSYCHOLOGY EVALUATION     Reviewed the neuroimaging independently     Assessment:    64 Years old AA Female with PMHX as above came of the evaluation of Headaches.   - Chronic Migraines Headaches.  - Chronic Tension Headaches.   - Obesity.   Plan:   Daily HA's / Migraines has dropped to 1 x month since increased on the Inderal.  Increase Inderal 80 to 120 mg daily.    Sleeping problems - PCP started a new medication ( Seroquel ).     Ambulatory referral to Sleep Clinic - home study / CHRISSY better / no CPaP was recommended.     Obesity: discussed diet / exercise / life style changes / Ozempic - lost 5 to 10 lbs in the last few weeks / follow by PCP.   Eyes Clinic: 04/ 2024 - no issues according to Patient.    D/c Topamax ( undesirable SE ) and  Elavil ( weight gain ).       Labs: HIV - non reactive.      Personally reviewed CT Sinuses: 10/ 2024 - normal.     MEDICAL/SURGICAL COMORBIDITIES     All relevant medical comorbidities noted and managed by primary care physician and medical care team.      HEALTHY LIFESTYLE AND PREVENTATIVE CARE    The patient to adhere to the age-appropriate health maintenance guidelines including screening tests and vaccinations.   The patient to adhere to  healthy lifestyle, optimal weight, exercise, healthy diet,   good sleep hygiene and avoiding drugs including smoking, alcohol and recreational drugs.    RTC: 3 months.      I spent a total of 20 minutes on the day of the visit.This includes face to face time and non-face to face time preparing to see the patient   (eg, review of tests), obtaining and/or reviewing separately obtained history,   documenting clinical information in the electronic or other health record,   independently interpreting results and communicating results to the patient/family/caregiver, or care coordinator.    Please do not hesitate to contact me with any updates, questions or concerns.    Lionel Singh MD.  General Neurologist.     The patient location is: [place]  The chief complaint leading to consultation is: chronic headaches.     Visit type: audiovisual    Face to Face time with patient: yes.   20 minutes of total time spent on the encounter, which includes face to face time and non-face to face time preparing to see the patient   (eg, review of tests), Obtaining and/or reviewing separately obtained history,   Documenting clinical information in the electronic or other health record,   Independently interpreting results (not separately reported) and communicating results to the patient/family/caregiver,   or Care coordination (not separately reported).     Each patient to whom he or she provides medical services by telemedicine is:  (1) informed of the relationship between the physician    and patient and the respective role of any other health care provider with respect to management of the patient; and   (2) notified that he or she may decline to receive medical services by telemedicine and may withdraw from such care at any time.    Notes:            [1]   Current Outpatient Medications:     albuterol 90 mcg/actuation inhaler, Inhale 2 puffs into the lungs every 4 (four) hours as needed., Disp: 1 Inhaler, Rfl: 2    amLODIPine (NORVASC) 10 MG tablet, Take 1 tablet by mouth once daily., Disp: , Rfl:     atorvastatin (LIPITOR) 40 MG tablet, , Disp: , Rfl:     desloratadine (CLARINEX) 5 mg tablet, Take 1 tablet (5 mg total) by mouth once daily., Disp: 90 tablet, Rfl: 3    ergocalciferol (ERGOCALCIFEROL) 50,000 unit Cap, Take 50,000 Units by mouth every 7 days., Disp: , Rfl:     fluconazole (DIFLUCAN) 150 MG Tab, Take 150 mg by mouth once., Disp: , Rfl:     fluticasone (FLONASE SENSIMIST) 27.5 mcg/actuation nasal spray, 2 sprays by Nasal route once daily., Disp: 9.1 mL, Rfl: 6    furosemide (LASIX) 40 MG tablet, Take 1 tablet (40 mg total) by mouth 2 (two) times daily., Disp: 60 tablet, Rfl: 3    gabapentin (NEURONTIN) 300 MG capsule, Take 300 mg by mouth 3 (three) times daily., Disp: , Rfl:     hydrocodone-acetaminophen 10-325mg (NORCO)  mg Tab, Take by mouth., Disp: , Rfl:     LINZESS 290 mcg Cap capsule, Take 1 capsule by mouth once daily., Disp: , Rfl:     montelukast (SINGULAIR) 10 mg tablet, Take 1 tablet by mouth once daily., Disp: , Rfl:     montelukast (SINGULAIR) 10 mg tablet, Take 1 tablet (10 mg total) by mouth once daily., Disp: 90 tablet, Rfl: 3    multivitamin with folic acid 400 mcg Tab, Take 1 tablet by mouth once daily., Disp: , Rfl:     olopatadine-mometasone 665-25 mcg/spray Spry, 2 sprays by Nasal route 2 (two) times daily., Disp: 29 g, Rfl: 11    ondansetron (ZOFRAN-ODT) 4 MG TbDL, Take 4 mg by mouth once., Disp: , Rfl:     OZEMPIC 2 mg/dose (8 mg/3 mL) PnIj, INJECT 2MG  UNDER THE SKIN ONE TIME WEEKLY, Disp: , Rfl:     pantoprazole (PROTONIX) 20 MG tablet, Take 1 tablet (20 mg total) by mouth once daily., Disp: 30 tablet, Rfl: 0    potassium chloride SA (K-DUR,KLOR-CON) 10 MEQ tablet, Take 20 mEq by mouth 2 (two) times daily., Disp: , Rfl:     propranoloL (INDERAL LA) 80 MG 24 hr capsule, Take 1 capsule (80 mg total) by mouth once daily., Disp: 30 capsule, Rfl: 3  [2]   Social History  Socioeconomic History    Marital status: Single   Occupational History    Occupation:      Employer: [employer]   Tobacco Use    Smoking status: Former     Current packs/day: 0.50     Average packs/day: 0.5 packs/day for 4.0 years (2.0 ttl pk-yrs)     Types: Cigarettes     Passive exposure: Past    Tobacco comments:     quit smoking 2011   Substance and Sexual Activity    Alcohol use: Yes     Alcohol/week: 7.0 standard drinks of alcohol     Types: 7 Glasses of wine per week     Comment: 1 wine cooler a nite    Drug use: No    Sexual activity: Never     Social Drivers of Health     Financial Resource Strain: Unknown (11/5/2018)    Received from [facility]sidi[facility] and Affili[facility]    Overall Financial Resource Strain (CARDIA)     Difficulty of Paying Living Expenses: Patient declined   Food Insecurity: Unknown (11/5/2018)    Received from [facility] and [facility]    Hunger Vital Sign     Worried About Running Out of Food in the Last Year: Patient declined     Ran Out of Food in the Last Year: Patient declined   Transportation Needs: Unknown (11/5/2018)    Received from [facility] and [facility]    PRAPARE - Transportation     Lack of Transportation (Medical): Patient declined     Lack of Transportation (Non-Medical): Patient declined   Physical Activity: Unknown (11/5/2018)    Received from [employer]  [facility]sidiaries and Affiliates    Exercise Vital Sign     Days of Exercise per Week: Patient declined     Minutes of Exercise per Session: Patient declined   Stress: Unknown (11/5/2018)    Received from [facility] and [facility]    [facility] - Occupational Stress Questionnaire     Feeling of Stress : Patient declined

## 2025-06-27 ENCOUNTER — TELEPHONE (OUTPATIENT)
Dept: PSYCHIATRY | Facility: CLINIC | Age: 65
End: 2025-06-27
Payer: MEDICARE

## 2025-07-01 ENCOUNTER — OFFICE VISIT (OUTPATIENT)
Dept: NEUROLOGY | Facility: CLINIC | Age: 65
End: 2025-07-01
Payer: MEDICARE

## 2025-07-01 DIAGNOSIS — G43.719 INTRACTABLE CHRONIC MIGRAINE WITHOUT AURA AND WITHOUT STATUS MIGRAINOSUS: Primary | ICD-10-CM

## 2025-07-01 DIAGNOSIS — G44.229 CHRONIC TENSION-TYPE HEADACHE, NOT INTRACTABLE: ICD-10-CM

## 2025-07-01 PROCEDURE — 1160F RVW MEDS BY RX/DR IN RCRD: CPT | Mod: CPTII,95,, | Performed by: PSYCHIATRY & NEUROLOGY

## 2025-07-01 PROCEDURE — 4010F ACE/ARB THERAPY RXD/TAKEN: CPT | Mod: CPTII,95,, | Performed by: PSYCHIATRY & NEUROLOGY

## 2025-07-01 PROCEDURE — 98005 SYNCH AUDIO-VIDEO EST LOW 20: CPT | Mod: 95,,, | Performed by: PSYCHIATRY & NEUROLOGY

## 2025-07-01 PROCEDURE — 1159F MED LIST DOCD IN RCRD: CPT | Mod: CPTII,95,, | Performed by: PSYCHIATRY & NEUROLOGY

## 2025-07-01 RX ORDER — PROPRANOLOL HYDROCHLORIDE 120 MG/1
120 CAPSULE, EXTENDED RELEASE ORAL DAILY
Qty: 90 CAPSULE | Refills: 3 | Status: SHIPPED | OUTPATIENT
Start: 2025-07-01 | End: 2026-07-01

## 2025-07-03 ENCOUNTER — TELEPHONE (OUTPATIENT)
Dept: ALLERGY | Facility: CLINIC | Age: 65
End: 2025-07-03
Payer: MEDICARE

## 2025-07-11 ENCOUNTER — HOSPITAL ENCOUNTER (OUTPATIENT)
Dept: RADIOLOGY | Facility: HOSPITAL | Age: 65
Discharge: HOME | End: 2025-07-11
Attending: STUDENT IN AN ORGANIZED HEALTH CARE EDUCATION/TRAINING PROGRAM
Payer: MEDICARE

## 2025-07-11 ENCOUNTER — OFFICE VISIT (OUTPATIENT)
Dept: ALLERGY | Facility: CLINIC | Age: 65
End: 2025-07-11
Payer: MEDICARE

## 2025-07-11 VITALS
TEMPERATURE: 98 F | BODY MASS INDEX: 38.74 KG/M2 | SYSTOLIC BLOOD PRESSURE: 138 MMHG | HEART RATE: 89 BPM | WEIGHT: 218.69 LBS | DIASTOLIC BLOOD PRESSURE: 85 MMHG | OXYGEN SATURATION: 97 %

## 2025-07-11 DIAGNOSIS — J34.89 SINUS PRESSURE: ICD-10-CM

## 2025-07-11 DIAGNOSIS — J30.1 SEASONAL ALLERGIC RHINITIS DUE TO POLLEN: Primary | ICD-10-CM

## 2025-07-11 DIAGNOSIS — J30.89 ALLERGIC RHINITIS DUE TO DUST MITE: ICD-10-CM

## 2025-07-11 DIAGNOSIS — J32.9 RECURRENT SINUS INFECTIONS: ICD-10-CM

## 2025-07-11 PROCEDURE — 70220 X-RAY EXAM OF SINUSES: CPT | Mod: 26,,, | Performed by: RADIOLOGY

## 2025-07-11 PROCEDURE — 70220 X-RAY EXAM OF SINUSES: CPT | Mod: TC

## 2025-07-11 PROCEDURE — 99999 PR PBB SHADOW E&M-EST. PATIENT-LVL IV: CPT | Mod: PBBFAC,,, | Performed by: STUDENT IN AN ORGANIZED HEALTH CARE EDUCATION/TRAINING PROGRAM

## 2025-07-11 RX ORDER — MONTELUKAST SODIUM 10 MG/1
10 TABLET ORAL DAILY
Qty: 90 TABLET | Refills: 3 | Status: SHIPPED | OUTPATIENT
Start: 2025-07-11 | End: 2026-07-11

## 2025-07-11 RX ORDER — FLUTICASONE FUROATE 27.5 UG/1
2 SPRAY, METERED NASAL DAILY
Qty: 27.3 ML | Refills: 3 | Status: SHIPPED | OUTPATIENT
Start: 2025-07-11

## 2025-07-11 RX ORDER — DESLORATADINE 5 MG/1
5 TABLET ORAL DAILY
Qty: 90 TABLET | Refills: 3 | Status: SHIPPED | OUTPATIENT
Start: 2025-07-11 | End: 2026-07-11

## 2025-07-11 NOTE — PROGRESS NOTES
Allergy and Immunology  Established Patient Clinic Note    Date: 7/11/2025  Chief Complaint   Patient presents with    Follow-up     History  Sobia Jacobo is a 65 y.o. female being seen for follow-up today.    Allergic Rhinitis due to dust mites and tree/grass pollen   TMJ dysfunction/clenching  Ordered fluticasone intranasal spray daily  Ordered clear neck is and montelukast   Recommended over-the-counter mouth guard for clenching     Drug Allergy  - Mobic: Chest tightness, no urticaria   - ASA: tachycardia        Allergies, PMH, PSH, Social, and Family History were reviewed.    Medications Ordered Prior to Encounter[1]    Physical Examination  Vitals:    07/11/25 1518   BP: 138/85   Pulse: 89   Temp: 97.9 °F (36.6 °C)     GENERAL:  female in no apparent distress and well developed and well nourished  HEAD:  Normocephalic, without obvious abnormality, atraumatic  EYES: sclera anicteric, conjunctiva normochromic  EARS: normal TM's and external ear canals both ears  NOSE: without erythema or discharge, clear discharge, turbinates normal    OROPHARYNX: moist mucous membranes without erythema, exudates or petechiae  LYMPH NODES: normal, supple, no lymphadenopathy  LUNGS: clear to auscultation, no wheezes, rales or rhonchi, symmetric air entry.  HEART: normal rate, regular rhythm, normal S1, S2, no murmurs, rubs, clicks or gallops.  ABDOMEN: soft, nontender, nondistended, no masses or organomegaly.  MUSCULOSKELETAL: no gross joint deformity or swelling.  NEURO: alert, oriented, normal speech, no focal findings or movement disorder noted.  SKIN: normal coloration and turgor, no rashes, no suspicious skin lesions noted.     Assessment/Plan:   Problem List Items Addressed This Visit       Recurrent sinus infections    Overview   - Patient with recurrent infections   - Patient reported 3-4 infections per years  - No hx of otitis media or PNA in adulthood   - No hx of sepsis or recurrent viral/fungal infection          Seasonal allergic rhinitis due to pollen - Primary    Overview   - 09/23/2024: Serum IgE for Zone 6 Aeroallergens positive to dust mites and tree/grass pollen          Allergic rhinitis due to dust mite    Overview   - 09/23/2024: Serum IgE for Zone 6 Aeroallergens positive to dust mites and tree/grass pollen           Other Visit Diagnoses         Sinus pressure        Relevant Orders    X-Ray Sinuses Min 3 Views          Allergic Rhinitis due to dust mites and tree/grass pollen   TMJ dysfunction/clenching  Ordered fluticasone intranasal spray daily  Ordered clear neck is and montelukast   Recommended over-the-counter mouth guard for clenching    Follow up:  Follow up in about 6 months (around 1/11/2026).    DISCLAIMER: This note was prepared with [employer] voice recognition transcription software. Garbled syntax, mangled pronouns, and other bizarre constructions may be attributed to that software system. While efforts were made to correct any mistakes made by this voice recognition program, some errors and/or omissions may remain in the note that were missed when the note was originally created.     Kelechi Martinez MD   [facility]  Allergy and Immunology       [1]   Current Outpatient Medications on File Prior to Visit   Medication Sig Dispense Refill    albuterol 90 mcg/actuation inhaler Inhale 2 puffs into the lungs every 4 (four) hours as needed. 1 Inhaler 2    amLODIPine (NORVASC) 10 MG tablet Take 1 tablet by mouth once daily.      atorvastatin (LIPITOR) 40 MG tablet       ergocalciferol (ERGOCALCIFEROL) 50,000 unit Cap Take 50,000 Units by mouth every 7 days.      fluconazole (DIFLUCAN) 150 MG Tab Take 150 mg by mouth once.      furosemide (LASIX) 40 MG tablet Take 1 tablet (40 mg total) by mouth 2 (two) times daily. 60 tablet 3    gabapentin (NEURONTIN) 300 MG capsule Take 300 mg by mouth 3 (three) times daily.      hydrocodone-acetaminophen 10-325mg (NORCO)  mg Tab Take by mouth.       montelukast (SINGULAIR) 10 mg tablet Take 1 tablet by mouth once daily.      multivitamin with folic acid 400 mcg Tab Take 1 tablet by mouth once daily.      ondansetron (ZOFRAN-ODT) 4 MG TbDL Take 4 mg by mouth once.      OZEMPIC 2 mg/dose (8 mg/3 mL) PnIj INJECT 2MG UNDER THE SKIN ONE TIME WEEKLY      pantoprazole (PROTONIX) 20 MG tablet Take 1 tablet (20 mg total) by mouth once daily. 30 tablet 0    potassium chloride SA (K-DUR,KLOR-CON) 10 MEQ tablet Take 20 mEq by mouth 2 (two) times daily.      propranoloL (INDERAL LA) 120 MG 24 hr capsule Take 1 capsule (120 mg total) by mouth once daily. 90 capsule 3    [DISCONTINUED] desloratadine (CLARINEX) 5 mg tablet Take 1 tablet (5 mg total) by mouth once daily. 90 tablet 3    [DISCONTINUED] fluticasone (FLONASE SENSIMIST) 27.5 mcg/actuation nasal spray 2 sprays by Nasal route once daily. 9.1 mL 6    [DISCONTINUED] montelukast (SINGULAIR) 10 mg tablet Take 1 tablet (10 mg total) by mouth once daily. 90 tablet 3    [DISCONTINUED] olopatadine-mometasone 665-25 mcg/spray Spry 2 sprays by Nasal route 2 (two) times daily. 29 g 11     No current facility-administered medications on file prior to visit.

## 2025-07-15 RX ORDER — FLUTICASONE FUROATE 27.5 UG/1
2 SPRAY, METERED NASAL DAILY
Qty: 27.3 ML | Refills: 3 | Status: SHIPPED | OUTPATIENT
Start: 2025-07-15

## 2025-08-05 DIAGNOSIS — G43.719 INTRACTABLE CHRONIC MIGRAINE WITHOUT AURA AND WITHOUT STATUS MIGRAINOSUS: ICD-10-CM

## 2025-08-05 DIAGNOSIS — G44.229 CHRONIC TENSION-TYPE HEADACHE, NOT INTRACTABLE: ICD-10-CM

## 2025-08-05 RX ORDER — FLUTICASONE FUROATE 27.5 UG/1
2 SPRAY, METERED NASAL DAILY
Qty: 27.3 ML | Refills: 3 | Status: SHIPPED | OUTPATIENT
Start: 2025-08-05 | End: 2026-08-05

## 2025-08-05 RX ORDER — PROPRANOLOL HYDROCHLORIDE 120 MG/1
120 CAPSULE, EXTENDED RELEASE ORAL DAILY
Qty: 90 CAPSULE | Refills: 3 | Status: SHIPPED | OUTPATIENT
Start: 2025-08-05 | End: 2026-08-05